# Patient Record
Sex: FEMALE | Race: WHITE | Employment: OTHER | URBAN - METROPOLITAN AREA
[De-identification: names, ages, dates, MRNs, and addresses within clinical notes are randomized per-mention and may not be internally consistent; named-entity substitution may affect disease eponyms.]

---

## 2017-01-05 DIAGNOSIS — Z12.31 SCREENING MAMMOGRAM, ENCOUNTER FOR: ICD-10-CM

## 2017-01-09 DIAGNOSIS — Z78.0 POST-MENOPAUSAL: ICD-10-CM

## 2017-01-12 DIAGNOSIS — M85.80 OSTEOPENIA: ICD-10-CM

## 2017-01-13 ENCOUNTER — TELEPHONE (OUTPATIENT)
Dept: FAMILY MEDICINE CLINIC | Age: 82
End: 2017-01-13

## 2017-01-13 LAB — VITAMIN D 25-HYDROXY: 26.84 NG/ML (ref 30–100)

## 2017-01-15 DIAGNOSIS — E55.9 VITAMIN D DEFICIENCY: Primary | ICD-10-CM

## 2017-01-16 ENCOUNTER — TELEPHONE (OUTPATIENT)
Dept: FAMILY MEDICINE CLINIC | Age: 82
End: 2017-01-16

## 2017-01-17 ENCOUNTER — OFFICE VISIT (OUTPATIENT)
Dept: FAMILY MEDICINE CLINIC | Age: 82
End: 2017-01-17

## 2017-01-17 VITALS
HEIGHT: 61 IN | RESPIRATION RATE: 16 BRPM | WEIGHT: 143.13 LBS | BODY MASS INDEX: 27.02 KG/M2 | SYSTOLIC BLOOD PRESSURE: 150 MMHG | HEART RATE: 80 BPM | DIASTOLIC BLOOD PRESSURE: 84 MMHG

## 2017-01-17 DIAGNOSIS — M85.80 OSTEOPENIA: Primary | ICD-10-CM

## 2017-01-17 PROCEDURE — 99213 OFFICE O/P EST LOW 20 MIN: CPT | Performed by: FAMILY MEDICINE

## 2017-01-17 RX ORDER — RALOXIFENE HYDROCHLORIDE 60 MG/1
60 TABLET, FILM COATED ORAL DAILY
Qty: 90 TABLET | Refills: 3 | Status: SHIPPED | OUTPATIENT
Start: 2017-01-17 | End: 2018-01-16

## 2017-01-22 ASSESSMENT — ENCOUNTER SYMPTOMS
SINUS PRESSURE: 0
CONSTIPATION: 0
SHORTNESS OF BREATH: 0

## 2017-05-12 LAB
A/G RATIO: 2.4 (ref 1.5–2.5)
ALBUMIN SERPL-MCNC: 4.6 GM/DL (ref 3.5–5)
ALP BLD-CCNC: 39 IU/L (ref 39–118)
ALT SERPL-CCNC: 21 IU/L (ref 10–40)
ANION GAP SERPL CALCULATED.3IONS-SCNC: 10 MMOL/L (ref 4–12)
AST SERPL-CCNC: 21 IU/L (ref 15–41)
BILIRUB SERPL-MCNC: 1.1 MG/DL (ref 0.2–1)
BUN BLDV-MCNC: 28 MG/DL (ref 7–20)
CALCIUM SERPL-MCNC: 9.6 MG/DL (ref 8.8–10.5)
CHLORIDE BLD-SCNC: 106 MEQ/L (ref 101–111)
CHOLESTEROL/HDL RELATIVE RISK: 4.7 (ref 4–4.4)
CHOLESTEROL: 250 MG/DL
CO2: 26 MEQ/L (ref 21–32)
CREAT SERPL-MCNC: 1.27 MG/DL (ref 0.6–1.3)
CREATININE CLEARANCE: 40
DIRECT-LDL / HDL RISK: 3.5
GLUCOSE: 103 MG/DL (ref 70–110)
HDLC SERPL-MCNC: 53 MG/DL
LDL CHOLESTEROL DIRECT: 189 MG/DL
POTASSIUM SERPL-SCNC: 4.2 MEQ/L (ref 3.6–5)
SODIUM BLD-SCNC: 142 MEQ/L (ref 135–145)
TOTAL PROTEIN: 6.5 G/DL (ref 6.2–8)
TRIGL SERPL-MCNC: 267 MG/DL
TSH SERPL DL<=0.05 MIU/L-ACNC: 11.66 MCIU/ML (ref 0.49–4.67)
VLDLC SERPL CALC-MCNC: 53 MG/DL

## 2017-07-18 ENCOUNTER — OFFICE VISIT (OUTPATIENT)
Dept: FAMILY MEDICINE CLINIC | Age: 82
End: 2017-07-18

## 2017-07-18 VITALS
BODY MASS INDEX: 27.75 KG/M2 | DIASTOLIC BLOOD PRESSURE: 60 MMHG | WEIGHT: 147 LBS | RESPIRATION RATE: 14 BRPM | HEART RATE: 80 BPM | SYSTOLIC BLOOD PRESSURE: 138 MMHG | HEIGHT: 61 IN

## 2017-07-18 DIAGNOSIS — I10 ESSENTIAL HYPERTENSION: Primary | ICD-10-CM

## 2017-07-18 DIAGNOSIS — E55.9 VITAMIN D DEFICIENCY: ICD-10-CM

## 2017-07-18 DIAGNOSIS — E03.9 ACQUIRED HYPOTHYROIDISM: ICD-10-CM

## 2017-07-18 PROCEDURE — 99214 OFFICE O/P EST MOD 30 MIN: CPT | Performed by: FAMILY MEDICINE

## 2017-07-18 ASSESSMENT — ENCOUNTER SYMPTOMS
CONSTIPATION: 0
SINUS PRESSURE: 0
SHORTNESS OF BREATH: 0

## 2017-07-18 ASSESSMENT — PATIENT HEALTH QUESTIONNAIRE - PHQ9
SUM OF ALL RESPONSES TO PHQ9 QUESTIONS 1 & 2: 0
2. FEELING DOWN, DEPRESSED OR HOPELESS: 0
SUM OF ALL RESPONSES TO PHQ QUESTIONS 1-9: 0
1. LITTLE INTEREST OR PLEASURE IN DOING THINGS: 0

## 2017-08-01 LAB
TSH SERPL DL<=0.05 MIU/L-ACNC: 9.61 MCIU/ML (ref 0.49–4.67)
VITAMIN D 25-HYDROXY: 73.17 NG/ML (ref 30–100)

## 2017-08-03 DIAGNOSIS — E03.9 ACQUIRED HYPOTHYROIDISM: Primary | ICD-10-CM

## 2017-08-03 DIAGNOSIS — E55.9 VITAMIN D DEFICIENCY: ICD-10-CM

## 2017-08-04 ENCOUNTER — TELEPHONE (OUTPATIENT)
Dept: FAMILY MEDICINE CLINIC | Age: 82
End: 2017-08-04

## 2017-08-29 LAB
AVERAGE GLUCOSE: NORMAL
HBA1C MFR BLD: 6.8 %

## 2017-09-26 ENCOUNTER — TELEPHONE (OUTPATIENT)
Dept: FAMILY MEDICINE CLINIC | Age: 82
End: 2017-09-26

## 2017-09-28 ENCOUNTER — OFFICE VISIT (OUTPATIENT)
Dept: FAMILY MEDICINE CLINIC | Age: 82
End: 2017-09-28

## 2017-09-28 VITALS
DIASTOLIC BLOOD PRESSURE: 70 MMHG | RESPIRATION RATE: 16 BRPM | HEART RATE: 72 BPM | HEIGHT: 61 IN | SYSTOLIC BLOOD PRESSURE: 148 MMHG | BODY MASS INDEX: 26.65 KG/M2 | WEIGHT: 141.13 LBS

## 2017-09-28 DIAGNOSIS — T14.8XXA MUSCLE STRAIN: ICD-10-CM

## 2017-09-28 DIAGNOSIS — R73.9 HYPERGLYCEMIA: Primary | ICD-10-CM

## 2017-09-28 LAB
GLUCOSE BLD-MCNC: 94 MG/DL
HBA1C MFR BLD: 6.4 %

## 2017-09-28 PROCEDURE — 99213 OFFICE O/P EST LOW 20 MIN: CPT | Performed by: FAMILY MEDICINE

## 2017-09-28 PROCEDURE — 82962 GLUCOSE BLOOD TEST: CPT | Performed by: FAMILY MEDICINE

## 2017-09-28 PROCEDURE — 83036 HEMOGLOBIN GLYCOSYLATED A1C: CPT | Performed by: FAMILY MEDICINE

## 2017-09-28 ASSESSMENT — ENCOUNTER SYMPTOMS
CONSTIPATION: 0
SHORTNESS OF BREATH: 0
SINUS PRESSURE: 0

## 2017-11-28 LAB
TSH SERPL DL<=0.05 MIU/L-ACNC: 12.1 MCIU/ML (ref 0.49–4.67)
VITAMIN D 25-HYDROXY: 94.09 NG/ML (ref 30–100)

## 2017-11-30 DIAGNOSIS — E55.9 VITAMIN D DEFICIENCY: Primary | ICD-10-CM

## 2017-12-01 ENCOUNTER — TELEPHONE (OUTPATIENT)
Dept: FAMILY MEDICINE CLINIC | Age: 82
End: 2017-12-01

## 2017-12-01 ENCOUNTER — OFFICE VISIT (OUTPATIENT)
Dept: FAMILY MEDICINE CLINIC | Age: 82
End: 2017-12-01

## 2017-12-01 VITALS
BODY MASS INDEX: 26.34 KG/M2 | DIASTOLIC BLOOD PRESSURE: 70 MMHG | HEIGHT: 61 IN | HEART RATE: 60 BPM | SYSTOLIC BLOOD PRESSURE: 140 MMHG | RESPIRATION RATE: 16 BRPM | WEIGHT: 139.5 LBS

## 2017-12-01 DIAGNOSIS — M13.0 POLYARTHRITIS: ICD-10-CM

## 2017-12-01 DIAGNOSIS — E03.4 HYPOTHYROIDISM DUE TO ACQUIRED ATROPHY OF THYROID: ICD-10-CM

## 2017-12-01 DIAGNOSIS — R10.10 UPPER ABDOMINAL PAIN: Primary | ICD-10-CM

## 2017-12-01 DIAGNOSIS — I10 ESSENTIAL HYPERTENSION: ICD-10-CM

## 2017-12-01 PROCEDURE — 99213 OFFICE O/P EST LOW 20 MIN: CPT | Performed by: FAMILY MEDICINE

## 2017-12-01 RX ORDER — LOSARTAN POTASSIUM AND HYDROCHLOROTHIAZIDE 12.5; 1 MG/1; MG/1
1 TABLET ORAL DAILY
Qty: 90 TABLET | Refills: 3 | Status: SHIPPED | OUTPATIENT
Start: 2017-12-01 | End: 2018-01-21 | Stop reason: SINTOL

## 2017-12-01 RX ORDER — LEVOTHYROXINE SODIUM 88 MCG
88 TABLET ORAL DAILY
Qty: 90 TABLET | Refills: 3 | Status: SHIPPED | OUTPATIENT
Start: 2017-12-01 | End: 2019-08-20

## 2017-12-01 RX ORDER — MELOXICAM 15 MG/1
15 TABLET ORAL DAILY
Qty: 90 TABLET | Refills: 3 | Status: SHIPPED | OUTPATIENT
Start: 2017-12-01 | End: 2018-01-16

## 2017-12-01 ASSESSMENT — ENCOUNTER SYMPTOMS
DIARRHEA: 0
ABDOMINAL PAIN: 1
SINUS PRESSURE: 0
SHORTNESS OF BREATH: 0
NAUSEA: 0
CONSTIPATION: 0

## 2017-12-01 ASSESSMENT — PATIENT HEALTH QUESTIONNAIRE - PHQ9
1. LITTLE INTEREST OR PLEASURE IN DOING THINGS: 0
SUM OF ALL RESPONSES TO PHQ QUESTIONS 1-9: 0
SUM OF ALL RESPONSES TO PHQ9 QUESTIONS 1 & 2: 0
2. FEELING DOWN, DEPRESSED OR HOPELESS: 0

## 2017-12-01 NOTE — TELEPHONE ENCOUNTER
----- Message from Denia Valenzuela MD sent at 11/30/2017 10:58 PM EST -----  Let her know that the vit D level is fine and she can now stop it all together. Check the vit D level though in late May. The TSH is even more underactive than when it was checked last. See if she is taking it properly, and is on the MELITA synthroid 88 mcg every day.

## 2017-12-01 NOTE — PROGRESS NOTES
Subjective:      Patient ID: Roselyn Barr is a 80 y.o. female. HPI  1. There is soreness to the bilateral subcostal area at night and seems to be getting worse. There is some radiation down the left side. 2. There are leg cramps and pain in the posterior thighs/lower legs and feet since the summer and seems to be worsening. 3. She has been on the synthroid every other day for months. She was concerned that it had caused the hair to fall out in the past. We reviewed the TSH still being up  Review of Systems   Constitutional: Negative for fatigue. HENT: Negative for sinus pressure. Eyes: Negative for visual disturbance. Respiratory: Negative for shortness of breath. Cardiovascular: Negative for chest pain. Gastrointestinal: Positive for abdominal pain. Negative for constipation, diarrhea and nausea. Genitourinary: Negative. Musculoskeletal: Positive for joint swelling and myalgias. Negative for arthralgias. Skin: Negative for rash. Neurological: Negative for headaches. The patient's medications, allergies, past medical problems, surgical, social, and family histories were reviewed and updated as needed. Objective:   Physical Exam   Constitutional: She is oriented to person, place, and time. She appears well-developed and well-nourished. No distress. HENT:   Head: Normocephalic and atraumatic. Eyes: Conjunctivae are normal. No scleral icterus. Neck: No tracheal deviation present. Cardiovascular: Normal rate, regular rhythm and normal heart sounds. Pulmonary/Chest: Effort normal and breath sounds normal.   Abdominal: Soft. Bowel sounds are normal. She exhibits no mass. There is no tenderness. Musculoskeletal: She exhibits no edema. Neurological: She is alert and oriented to person, place, and time. Skin: Skin is warm and dry. Psychiatric: She has a normal mood and affect. Her behavior is normal.   Blood pressure (!) 140/70, pulse 60, resp.  rate 16, height 5' 1\"

## 2017-12-03 LAB
CREAT SERPL-MCNC: 2.4 MG/DL (ref 0.6–1.3)
CREATININE CLEARANCE: 19

## 2017-12-04 ENCOUNTER — TELEPHONE (OUTPATIENT)
Dept: FAMILY MEDICINE CLINIC | Age: 82
End: 2017-12-04

## 2017-12-04 NOTE — TELEPHONE ENCOUNTER
Samantha Ponce from [address] [place] [address] called to inform you pt's creatinine was to high to do contrast for her CT per Dr Yvonne Durham.    Test was performed without contrast

## 2017-12-06 ENCOUNTER — TELEPHONE (OUTPATIENT)
Dept: FAMILY MEDICINE CLINIC | Age: 82
End: 2017-12-06

## 2017-12-06 DIAGNOSIS — R10.10 UPPER ABDOMINAL PAIN: Primary | ICD-10-CM

## 2017-12-13 ENCOUNTER — TELEPHONE (OUTPATIENT)
Dept: FAMILY MEDICINE CLINIC | Age: 82
End: 2017-12-13

## 2017-12-13 NOTE — TELEPHONE ENCOUNTER
----- Message from Chemo Miranda MD sent at 12/13/2017  1:29 PM EST -----  Let her know that the [facility] showed several gall stones but no proof that they are the cause of her trouble. She could discuss it with a general surgeon or GI if she wishes. Does she have a preference on some one she would want to see?

## 2018-01-09 LAB — TSH SERPL DL<=0.05 MIU/L-ACNC: 1.31 MCIU/ML (ref 0.49–4.67)

## 2018-01-12 ENCOUNTER — TELEPHONE (OUTPATIENT)
Dept: FAMILY MEDICINE CLINIC | Age: 83
End: 2018-01-12

## 2018-01-16 ENCOUNTER — OFFICE VISIT (OUTPATIENT)
Dept: FAMILY MEDICINE CLINIC | Age: 83
End: 2018-01-16

## 2018-01-16 VITALS
SYSTOLIC BLOOD PRESSURE: 134 MMHG | HEART RATE: 88 BPM | BODY MASS INDEX: 25.49 KG/M2 | DIASTOLIC BLOOD PRESSURE: 54 MMHG | HEIGHT: 61 IN | WEIGHT: 135 LBS | RESPIRATION RATE: 14 BRPM

## 2018-01-16 DIAGNOSIS — I10 ESSENTIAL HYPERTENSION: Primary | ICD-10-CM

## 2018-01-16 DIAGNOSIS — K80.20 CALCULUS OF GALLBLADDER WITHOUT CHOLECYSTITIS WITHOUT OBSTRUCTION: ICD-10-CM

## 2018-01-16 DIAGNOSIS — K44.9 HIATAL HERNIA: ICD-10-CM

## 2018-01-16 LAB
ANION GAP SERPL CALCULATED.3IONS-SCNC: 8 MMOL/L (ref 4–12)
BUN BLDV-MCNC: 63 MG/DL (ref 7–20)
CALCIUM SERPL-MCNC: 10.4 MG/DL (ref 8.8–10.5)
CHLORIDE BLD-SCNC: 105 MEQ/L (ref 101–111)
CO2: 27 MEQ/L (ref 21–32)
CREAT SERPL-MCNC: 5.49 MG/DL (ref 0.6–1.3)
CREATININE CLEARANCE: 7
GLUCOSE: 86 MG/DL (ref 70–110)
POTASSIUM SERPL-SCNC: 4.6 MEQ/L (ref 3.6–5)
SODIUM BLD-SCNC: 140 MEQ/L (ref 135–145)

## 2018-01-16 PROCEDURE — 99213 OFFICE O/P EST LOW 20 MIN: CPT | Performed by: FAMILY MEDICINE

## 2018-01-16 ASSESSMENT — ENCOUNTER SYMPTOMS
SINUS PRESSURE: 0
SHORTNESS OF BREATH: 0
CONSTIPATION: 0

## 2018-01-16 NOTE — PROGRESS NOTES
Subjective:      Patient ID: Carla Smith is a 80 y.o. female. HPI  1. We discussed the upper abd pain and how she had a CT and US. The pain is resolved. 2. We reviewed the hiatial hernia and gall stones  3. We discussed the elevated Cr from when she had the CT done and the need to repeat it  4. She stopped the evista due to muscle and joint pains and noticed an improvement but not complete  Review of Systems   Constitutional: Negative for fatigue. HENT: Negative for sinus pressure. Eyes: Negative for visual disturbance. Respiratory: Negative for shortness of breath. Cardiovascular: Negative for chest pain. Gastrointestinal: Negative for constipation. Genitourinary: Negative. Musculoskeletal: Negative for arthralgias. Skin: Negative for rash. Neurological: Negative for headaches. The patient's medications, allergies, past medical problems, surgical, social, and family histories were reviewed and updated as needed. Objective:   Physical Exam   Constitutional: She is oriented to person, place, and time. She appears well-developed and well-nourished. No distress. HENT:   Head: Normocephalic and atraumatic. Eyes: Conjunctivae are normal. No scleral icterus. Neck: No tracheal deviation present. Cardiovascular: Normal rate, regular rhythm and normal heart sounds. Pulmonary/Chest: Effort normal and breath sounds normal.   Musculoskeletal: She exhibits no edema. Neurological: She is alert and oriented to person, place, and time. Skin: Skin is warm and dry. Psychiatric: She has a normal mood and affect. Her behavior is normal.   Blood pressure (!) 134/54, pulse 88, resp. rate 14, height 5' 1\" (1.549 m), weight 135 lb (61.2 kg), not currently breastfeeding. Assessment:      1. Essential hypertension  Basic Metabolic Panel   2. Hiatal hernia     3.  Calculus of gallbladder without cholecystitis without obstruction             Plan:      Do the follow up non fasting

## 2018-01-21 DIAGNOSIS — I10 ESSENTIAL HYPERTENSION: Primary | ICD-10-CM

## 2018-01-22 ENCOUNTER — TELEPHONE (OUTPATIENT)
Dept: FAMILY MEDICINE CLINIC | Age: 83
End: 2018-01-22

## 2018-01-24 LAB
ANION GAP SERPL CALCULATED.3IONS-SCNC: 10 MMOL/L (ref 4–12)
BUN BLDV-MCNC: 57 MG/DL (ref 7–20)
CALCIUM SERPL-MCNC: 10.4 MG/DL (ref 8.8–10.5)
CHLORIDE BLD-SCNC: 104 MEQ/L (ref 101–111)
CO2: 24 MEQ/L (ref 21–32)
CREAT SERPL-MCNC: 4.7 MG/DL (ref 0.6–1.3)
CREATININE CLEARANCE: 9
GLUCOSE: 93 MG/DL (ref 70–110)
POTASSIUM SERPL-SCNC: 4 MEQ/L (ref 3.6–5)
SODIUM BLD-SCNC: 138 MEQ/L (ref 135–145)

## 2018-01-25 DIAGNOSIS — N17.9 ACUTE KIDNEY INJURY (HCC): Primary | ICD-10-CM

## 2018-01-26 ENCOUNTER — TELEPHONE (OUTPATIENT)
Dept: FAMILY MEDICINE CLINIC | Age: 83
End: 2018-01-26

## 2018-01-26 NOTE — TELEPHONE ENCOUNTER
----- Message from Valentine Tomas MD sent at 1/25/2018  9:13 PM EST -----  Let her know that the kidney functions is only slighty better. She needs to see a kidney specialist. Does she want to use [facility] or [facility]. She will also need a renal US. I would like to see her 1/26/18 and to get started on ordering the tests even prior to the appointment with me. Continue off the losartan/hydrochlorothiazide and meloxicam and any other NSAID like aleve or ibuprofen.

## 2018-01-26 NOTE — TELEPHONE ENCOUNTER
Called pt to advise of need for test and sched at [facility] 1/26/18 at 3 pm pt needs to drink 20 oz fluid 1 hr before and sched for appt 1/29/18 pt is leaving for [place] in 1 1/2 wks from now will talk to you about referral at appt

## 2018-01-29 ENCOUNTER — OFFICE VISIT (OUTPATIENT)
Dept: FAMILY MEDICINE CLINIC | Age: 83
End: 2018-01-29

## 2018-01-29 ENCOUNTER — TELEPHONE (OUTPATIENT)
Dept: FAMILY MEDICINE CLINIC | Age: 83
End: 2018-01-29

## 2018-01-29 VITALS
SYSTOLIC BLOOD PRESSURE: 166 MMHG | HEART RATE: 84 BPM | BODY MASS INDEX: 25.18 KG/M2 | HEIGHT: 61 IN | WEIGHT: 133.38 LBS | DIASTOLIC BLOOD PRESSURE: 76 MMHG | RESPIRATION RATE: 16 BRPM

## 2018-01-29 DIAGNOSIS — N13.30 HYDRONEPHROSIS OF RIGHT KIDNEY: Primary | ICD-10-CM

## 2018-01-29 DIAGNOSIS — R31.21 ASYMPTOMATIC MICROSCOPIC HEMATURIA: ICD-10-CM

## 2018-01-29 DIAGNOSIS — I10 ESSENTIAL HYPERTENSION: ICD-10-CM

## 2018-01-29 DIAGNOSIS — N17.9 ACUTE KIDNEY INJURY (NONTRAUMATIC) (HCC): ICD-10-CM

## 2018-01-29 LAB
BACTERIA URINE, POC: ABNORMAL
BILIRUBIN URINE: 0 MG/DL
BLOOD, URINE: POSITIVE
CASTS URINE, POC: ABNORMAL
CLARITY: ABNORMAL
COLOR: COLORLESS
CRYSTALS URINE, POC: ABNORMAL
EPI CELLS URINE, POC: ABNORMAL
GLUCOSE URINE: ABNORMAL
KETONES, URINE: POSITIVE
LEUKOCYTE EST, POC: ABNORMAL
NITRITE, URINE: POSITIVE
PH UA: 5 (ref 4.5–8)
PROTEIN UA: POSITIVE
RBC URINE, POC: ABNORMAL
SPECIFIC GRAVITY UA: 1.01 (ref 1–1.03)
UROBILINOGEN, URINE: NORMAL
WBC URINE, POC: ABNORMAL
YEAST URINE, POC: ABNORMAL

## 2018-01-29 PROCEDURE — 99214 OFFICE O/P EST MOD 30 MIN: CPT | Performed by: FAMILY MEDICINE

## 2018-01-29 PROCEDURE — 81000 URINALYSIS NONAUTO W/SCOPE: CPT | Performed by: FAMILY MEDICINE

## 2018-01-29 RX ORDER — AMLODIPINE BESYLATE 5 MG/1
5 TABLET ORAL DAILY
Qty: 30 TABLET | Refills: 11 | Status: SHIPPED | OUTPATIENT
Start: 2018-01-29 | End: 2018-03-16 | Stop reason: ALTCHOICE

## 2018-01-29 ASSESSMENT — ENCOUNTER SYMPTOMS
CONSTIPATION: 0
SINUS PRESSURE: 0
SHORTNESS OF BREATH: 0

## 2018-01-29 NOTE — TELEPHONE ENCOUNTER
----- Message from Carmen Ibanez MD sent at 1/29/2018 11:04 AM EST -----  I spoke with Dr Cassi Sharp and he wishes her to have a CT abd/pelvis with oral contrast only before the appointment 1/31/18

## 2018-01-29 NOTE — PROGRESS NOTES
Specific Gravity, UA 1.010 1.005 - 1.030    Blood, Urine Positive     pH, UA 5 4.5 - 8.0    Protein, UA Positive (A) Negative    Nitrite, Urine Positive     Leukocytes, UA large     Urobilinogen, Urine Normal     rbc urine, poc none     wbc urine, poc loaded     bacteria urine, poc loaded     yeast urine, poc      casts urine, poc      epi cells urine, poc none     crystals urine, poc           Assessment:      1. Hydronephrosis of right kidney  External Referral To Urology    POC URINE with Microscopic   2. Essential hypertension  amLODIPine (NORVASC) 5 MG tablet    POC URINE with Microscopic   3. Acute kidney injury (nontraumatic) [facility])  External Referral To Urology    External Referral To Nephrology    POC URINE with Microscopic           Plan:       We will try to reach the nephrologist and the urologist  We will call about the urine culture result  Start the amlodipine   See me next week if you are still in town

## 2018-01-29 NOTE — PATIENT INSTRUCTIONS
We will try to reach the nephrologist and the urologist  We will call about the urine culture result  Start the amlodipine   See me next week if you are still in town

## 2018-01-31 LAB — URINE CULTURE, ROUTINE: ABNORMAL

## 2018-02-01 RX ORDER — SULFAMETHOXAZOLE AND TRIMETHOPRIM 800; 160 MG/1; MG/1
1 TABLET ORAL 2 TIMES DAILY
Qty: 10 TABLET | Refills: 0 | Status: SHIPPED | OUTPATIENT
Start: 2018-02-01 | End: 2018-02-11

## 2018-02-02 ENCOUNTER — TELEPHONE (OUTPATIENT)
Dept: FAMILY MEDICINE CLINIC | Age: 83
End: 2018-02-02

## 2018-02-12 ENCOUNTER — TELEPHONE (OUTPATIENT)
Dept: FAMILY MEDICINE CLINIC | Age: 83
End: 2018-02-12

## 2018-02-12 NOTE — TELEPHONE ENCOUNTER
Spoke with Brook. Introduced self/ role. Kinga Paige was recently discharged from [facility] for acute kidney injury. Kinga Paige voiced she is feeling better. She is urinating well without concerns. Reviewed medications with Brook, she voiced she got all scripts. Kinga Paige voiced she has a follow up appointment set for this Friday February 16, 2018. Educated on the importance of monitoring blood pressure. No concerns or needs voiced at this time, encouraged to contact the office if needed.

## 2018-03-09 ENCOUNTER — TELEPHONE (OUTPATIENT)
Dept: FAMILY MEDICINE CLINIC | Age: 83
End: 2018-03-09

## 2018-03-09 RX ORDER — EZETIMIBE 10 MG/1
10 TABLET ORAL DAILY
COMMUNITY
End: 2019-08-20 | Stop reason: ALTCHOICE

## 2018-03-09 RX ORDER — FUROSEMIDE 20 MG/1
20 TABLET ORAL DAILY
COMMUNITY

## 2018-03-09 RX ORDER — ASPIRIN 81 MG/1
81 TABLET, CHEWABLE ORAL DAILY
COMMUNITY

## 2018-03-09 RX ORDER — WARFARIN SODIUM 2.5 MG/1
2.5 TABLET ORAL DAILY
COMMUNITY

## 2018-03-09 RX ORDER — PROPAFENONE HYDROCHLORIDE 150 MG/1
150 TABLET, FILM COATED ORAL 2 TIMES DAILY
COMMUNITY
End: 2019-08-20 | Stop reason: ALTCHOICE

## 2018-03-11 PROBLEM — C90.00 MULTIPLE MYELOMA (HCC): Status: ACTIVE | Noted: 2018-02-01

## 2018-03-16 ENCOUNTER — OFFICE VISIT (OUTPATIENT)
Dept: FAMILY MEDICINE CLINIC | Age: 83
End: 2018-03-16

## 2018-03-16 VITALS
DIASTOLIC BLOOD PRESSURE: 60 MMHG | RESPIRATION RATE: 16 BRPM | HEIGHT: 61 IN | HEART RATE: 64 BPM | BODY MASS INDEX: 24.73 KG/M2 | WEIGHT: 131 LBS | SYSTOLIC BLOOD PRESSURE: 140 MMHG

## 2018-03-16 DIAGNOSIS — C90.00 MULTIPLE MYELOMA NOT HAVING ACHIEVED REMISSION (HCC): Primary | ICD-10-CM

## 2018-03-16 PROCEDURE — 99214 OFFICE O/P EST MOD 30 MIN: CPT | Performed by: FAMILY MEDICINE

## 2018-03-16 RX ORDER — PANTOPRAZOLE SODIUM 40 MG/1
TABLET, DELAYED RELEASE ORAL
COMMUNITY
Start: 2018-03-01

## 2018-03-16 RX ORDER — ONDANSETRON HYDROCHLORIDE 8 MG/1
8 TABLET, FILM COATED ORAL EVERY 8 HOURS PRN
COMMUNITY
Start: 2018-03-05 | End: 2020-11-25 | Stop reason: ALTCHOICE

## 2018-03-16 RX ORDER — DEXAMETHASONE 4 MG/1
20 TABLET ORAL
COMMUNITY
Start: 2018-03-01

## 2018-03-16 RX ORDER — LENALIDOMIDE 10 MG/1
CAPSULE ORAL
COMMUNITY
Start: 2018-03-07 | End: 2020-11-25 | Stop reason: ALTCHOICE

## 2018-04-03 ENCOUNTER — TELEPHONE (OUTPATIENT)
Dept: FAMILY MEDICINE CLINIC | Age: 83
End: 2018-04-03

## 2019-03-20 ENCOUNTER — TELEPHONE (OUTPATIENT)
Dept: FAMILY MEDICINE CLINIC | Age: 84
End: 2019-03-20

## 2019-03-20 RX ORDER — TRAMADOL HYDROCHLORIDE 50 MG/1
50 TABLET ORAL EVERY 6 HOURS PRN
COMMUNITY
End: 2019-08-20 | Stop reason: ALTCHOICE

## 2019-06-04 ENCOUNTER — OFFICE VISIT (OUTPATIENT)
Dept: INFECTIOUS DISEASES | Age: 84
End: 2019-06-04

## 2019-06-04 VITALS
HEIGHT: 61 IN | DIASTOLIC BLOOD PRESSURE: 58 MMHG | TEMPERATURE: 98.7 F | HEART RATE: 65 BPM | SYSTOLIC BLOOD PRESSURE: 115 MMHG | BODY MASS INDEX: 24.58 KG/M2 | WEIGHT: 130.2 LBS

## 2019-06-04 DIAGNOSIS — T85.71XA INFECTION ASSOCIATED WITH PERITONEAL DIALYSIS CATHETER, INITIAL ENCOUNTER (HCC): Primary | ICD-10-CM

## 2019-06-04 PROCEDURE — 99203 OFFICE O/P NEW LOW 30 MIN: CPT | Performed by: INTERNAL MEDICINE

## 2019-06-04 RX ORDER — POTASSIUM CHLORIDE 1500 MG/1
20 TABLET, EXTENDED RELEASE ORAL DAILY
COMMUNITY
Start: 2019-03-02 | End: 2019-08-20 | Stop reason: ALTCHOICE

## 2019-06-04 RX ORDER — ATORVASTATIN CALCIUM 40 MG/1
40 TABLET, FILM COATED ORAL NIGHTLY
COMMUNITY
Start: 2019-05-09 | End: 2019-06-04

## 2019-06-04 RX ORDER — FLUCONAZOLE 100 MG/1
100 TABLET ORAL DAILY
COMMUNITY
Start: 2019-06-03 | End: 2019-08-20 | Stop reason: ALTCHOICE

## 2019-06-04 RX ORDER — MELOXICAM 15 MG/1
15 TABLET ORAL DAILY
COMMUNITY
Start: 2019-03-28 | End: 2020-09-28

## 2019-06-04 RX ORDER — OXYBUTYNIN CHLORIDE 5 MG/1
5 TABLET ORAL PRN
COMMUNITY
End: 2019-08-20 | Stop reason: ALTCHOICE

## 2019-06-04 RX ORDER — ACETAMINOPHEN 160 MG
50000 TABLET,DISINTEGRATING ORAL WEEKLY
COMMUNITY

## 2019-06-18 ENCOUNTER — OFFICE VISIT (OUTPATIENT)
Dept: INFECTIOUS DISEASES | Age: 84
End: 2019-06-18

## 2019-06-18 VITALS
TEMPERATURE: 99 F | HEART RATE: 92 BPM | DIASTOLIC BLOOD PRESSURE: 79 MMHG | SYSTOLIC BLOOD PRESSURE: 121 MMHG | WEIGHT: 128.4 LBS | HEIGHT: 61 IN | BODY MASS INDEX: 24.24 KG/M2

## 2019-06-18 DIAGNOSIS — T85.71XS: Primary | ICD-10-CM

## 2019-06-18 PROCEDURE — 99213 OFFICE O/P EST LOW 20 MIN: CPT | Performed by: INTERNAL MEDICINE

## 2019-06-18 RX ORDER — CLOTRIMAZOLE 1 %
CREAM (GRAM) TOPICAL DAILY
COMMUNITY
Start: 2019-06-04 | End: 2019-08-20 | Stop reason: ALTCHOICE

## 2019-06-18 NOTE — PROGRESS NOTES
[facility]         Progress Note      Michael Glasgow  MEDICAL RECORD NUMBER:  [id number]  AGE: 80 y.o. GENDER: female  : 1935  EPISODE DATE:  2019    Subjective:     Chief Complaint   Patient presents with    Follow-up     Infection associated with peritoneal dialysis catheter         HISTORY of PRESENT ILLNESS HPI  She is an 80-year-old female patient with end-stage renal disease on peritoneal dialysis was initially sent to the ID clinic for exit site infection. The culture grew Candida albicans and was placed on oral Diflucan and antifungal cream.  She was advised to hold the gentamicin ointment to the catheter site. Today she was seen for her follow-up visit she has no new complaints, she denies any pain from the exit site there is minimal redness at that tip of the exit site there was not any induration or fever or chills. Overall she is feeling much better.   Her peritoneal dialysis fluid is clear there was no crusting fibrin products      PAST MEDICAL HISTORY        Diagnosis Date    Cholelithiasis 2018    Colon polyp, hyperplastic , ,     Fibrocystic breast disease     Hiatal hernia 2018    HTN (hypertension) 1999    Hyperlipemia 2013    Hyperlipidemia 2009    Hypothyroidism 2014    Multiple myeloma ([address] 2018    Osteopenia 2016    Polyarthritis     Varicose vein of leg     Vitamin D deficiency 2017       PAST SURGICAL HISTORY    Past Surgical History:   Procedure Laterality Date    CARPAL TUNNEL RELEASE      right    [facility]      growth off of tailbone    PACEMAKER INSERTION  2018    TONSILLECTOMY AND ADENOIDECTOMY         FAMILY HISTORY    Family History   Problem Relation Age of Onset    Heart Disease Mother        SOCIAL HISTORY    Social History     Tobacco Use    Smoking status: Never Smoker    Smokeless tobacco: Never Used   Substance Use Topics    Alcohol use: No    Drug use: No       ALLERGIES    Allergies   Allergen Reactions    Zetia [Ezetimibe]      Pt unsure of side affects       MEDICATIONS    Current Outpatient Medications on File Prior to Visit   Medication Sig Dispense Refill    clotrimazole (LOTRIMIN) 1 % cream Apply topically daily      fluconazole (DIFLUCAN) 100 MG tablet Take 100 mg by mouth daily      Calcium Polycarbophil (FIBERCON PO) Take by mouth 2 times daily      B Complex-C-Folic Acid (TRIPHROCAPS PO) Take by mouth daily      MAGNESIUM CL-CALCIUM CARBONATE PO Take 2 tablets by mouth      Cholecalciferol (VITAMIN D3) 2000 units CAPS Take by mouth daily      KLOR-CON M20 20 MEQ extended release tablet Take 20 mEq by mouth daily      oxybutynin (DITROPAN) 5 MG tablet Take 5 mg by mouth as needed      meloxicam (MOBIC) 15 MG tablet Take 15 mg by mouth daily      traMADol (ULTRAM) 50 MG tablet Take 50 mg by mouth every 6 hours as needed for Pain.  dexamethasone (DECADRON) 4 MG tablet 20 mg every 7 days Indications: Thakes on Wednesday 5 pills every Monday      REVLIMID 10 MG chemo capsule       pantoprazole (PROTONIX) 40 MG tablet       ondansetron (ZOFRAN) 8 MG tablet       aspirin 81 MG chewable tablet Take 81 mg by mouth daily      warfarin (COUMADIN) 2.5 MG tablet Take 2.5 mg by mouth daily      metoprolol tartrate (LOPRESSOR) 25 MG tablet Take 25 mg by mouth 2 times daily      propafenone (RYTHMOL) 150 MG tablet Take 150 mg by mouth 2 times daily      ezetimibe (ZETIA) 10 MG tablet Take 10 mg by mouth daily      furosemide (LASIX) 20 MG tablet Take 20 mg by mouth daily      SYNTHROID 88 MCG tablet Take 1 tablet by mouth Daily (Patient taking differently: Take 75 mcg by mouth Daily ) 90 tablet 3     No current facility-administered medications on file prior to visit. REVIEW OF SYSTEMS    Constitutional: no fever, no night sweats, no fatigue.   Head: no head ache , no head injury, no migranes. Eye: no blurring of vision, no double vision. Ears: no hearing difficulty, no tinnitus  Mouth/throat: no ulceration, dental caries , dysphagia  Lungs: no cough no chest pain  CVS: no palpitation, no chest pain, no shortness of breath  GI: no abdominal pain, no nausea , no vomiting, no constipation  MED: no dysuria, frequency and urgency, no hematuria, no kidney stones  Musculoskeletal: no joint pain, swelling , stiffness,  Endocrine: no polyuria, polydipsia, no cold or heat intolerance  Hematology: no anemia, no easy brusing or bleeding, no hx of clotting disorder  Dermatology: no skin rash, no eczema, no pruritis,  Psychiatry: no depression, no anxiety,no panic attacks, no suicide ideation      Objective:      /79 (Site: Right Lower Arm, Position: Sitting, Cuff Size: Small Adult)   Pulse 92   Temp 99 °F (37.2 °C)   Ht 5' 0.98\" (1.549 m)   Wt 128 lb 6.4 oz (58.2 kg)   BMI 24.27 kg/m²     Wt Readings from Last 3 Encounters:   06/18/19 128 lb 6.4 oz (58.2 kg)   06/04/19 130 lb 3.2 oz (59.1 kg)   03/16/18 131 lb (59.4 kg)       Immunization History   Administered Date(s) Administered    Influenza Vaccine, unspecified formulation 10/18/2016    Influenza Virus Vaccine 10/05/2012, 10/28/2013, 10/14/2014, 10/22/2015    Influenza, High Dose (Fluzone 65 yrs and older) 10/31/2017    Influenza, Triv, 3 Years and older, IM (Afluria (5 yrs and older) 10/18/2016    Pneumococcal 13-valent Conjugate ([id number]) 11/08/2016    Pneumococcal Polysaccharide ([id number]) 10/14/2014, 10/31/2017       PHYSICAL EXAM    /79 (Site: Right Lower Arm, Position: Sitting, Cuff Size: Small Adult)   Pulse 92   Temp 99 °F (37.2 °C)   Ht 5' 0.98\" (1.549 m)   Wt 128 lb 6.4 oz (58.2 kg)   BMI 24.27 kg/m²   General:  Awake, alert, not in distress. HEENT: Slightly pale conjunctiva, unicteric sclera, moist oral mucosa.   Chest: Bilateral air entry  Cardiovascular:  RRR ,S1S2, no murmur or gallop. Abdomen:  Soft, non tender to palpation. PD catheter in place there is no induration or drainage, the tip of the skin is red but not hot or tender  Extremities:   No edema. Skin:  Warm and dry. CNS: Oriented to person place and time         LABS       CBC:   Lab Results   Component Value Date    WBC 8.9 06/07/2019    HGB 8.8 06/07/2019    HCT 26.7 06/07/2019    .6 06/07/2019     06/07/2019     BMP:   Lab Results   Component Value Date     06/07/2019    K 4.6 06/07/2019     06/07/2019    CO2 21 06/07/2019    PHOS 4.4 06/07/2019    BUN 65 06/07/2019    CREATININE 4.53 06/07/2019     PT/INR:   Lab Results   Component Value Date    PROTIME 25.3 06/18/2019    INR 2.10 06/18/2019     Prealbumin: No results found for: PREALBUMIN  Albumin:  Lab Results   Component Value Date    LABALBU 3.5 06/07/2019     Sed Rate:No results found for: Dyane Fess  CRP: No results found for: CRP  Micro:   Lab Results   Component Value Date    [facility]  03/06/2018      Comment:      Blood Culture       Hemoglobin A1C:   Lab Results   Component Value Date    LABA1C 6.4 09/28/2017       Assessment:    PD catheter exit site local infection: It appears to be stable. Continue current  treatment, advised to contact if there is spreading redness fever abdominal pain or cloudy discoloration of the peritoneal fluid. Follow-up will be scheduled in 2 months     Patient Active Problem List   Diagnosis Code    Essential hypertension I10    Pure hypercholesterolemia E78.00    Acquired hypothyroidism E03.9    Polyarthritis M13.0    Osteopenia M85.80    Vitamin D deficiency E55.9    Colon polyp, hyperplastic K63.5    Hiatal hernia K44.9    Cholelithiasis K80.20    Multiple myeloma ([facility] [address] C90.00         Plan:     Patient examined and evaluated        Please see attached Discharge Instructions    Written patient dismissal instructions given to patient and signed by patient or POA.              Electronically signed by Inez Rosas MD,FACP@ TD@ at 1:32 PM

## 2019-08-20 ENCOUNTER — OFFICE VISIT (OUTPATIENT)
Dept: INFECTIOUS DISEASES | Age: 84
End: 2019-08-20

## 2019-08-20 VITALS
HEIGHT: 61 IN | HEART RATE: 62 BPM | DIASTOLIC BLOOD PRESSURE: 50 MMHG | TEMPERATURE: 98.6 F | SYSTOLIC BLOOD PRESSURE: 108 MMHG | BODY MASS INDEX: 25.26 KG/M2 | WEIGHT: 133.8 LBS

## 2019-08-20 DIAGNOSIS — T85.611D: Primary | ICD-10-CM

## 2019-08-20 PROCEDURE — 99213 OFFICE O/P EST LOW 20 MIN: CPT | Performed by: INTERNAL MEDICINE

## 2019-08-20 RX ORDER — ACYCLOVIR 200 MG/1
200 CAPSULE ORAL DAILY
COMMUNITY
Start: 2019-08-15 | End: 2020-09-28

## 2019-08-20 RX ORDER — GABAPENTIN 100 MG/1
100 CAPSULE ORAL 3 TIMES DAILY
COMMUNITY
Start: 2019-08-15 | End: 2020-09-28

## 2019-08-20 RX ORDER — LEVOTHYROXINE SODIUM 75 MCG
75 TABLET ORAL DAILY
COMMUNITY
Start: 2019-07-22

## 2019-08-20 RX ORDER — ALLOPURINOL 100 MG/1
100 TABLET ORAL DAILY
COMMUNITY
Start: 2019-07-15 | End: 2020-09-28

## 2019-08-20 NOTE — PROGRESS NOTES
Never Used   Substance Use Topics    Alcohol use: No    Drug use: No       ALLERGIES    Allergies   Allergen Reactions    Zetia [Ezetimibe]      Pt unsure of side affects       MEDICATIONS    Current Outpatient Medications on File Prior to Visit   Medication Sig Dispense Refill    SYNTHROID 75 MCG tablet Take 75 mcg by mouth daily      allopurinol (ZYLOPRIM) 100 MG tablet Take 100 mg by mouth daily      gabapentin (NEURONTIN) 100 MG capsule Take 100 mg by mouth 3 times daily.  acyclovir (ZOVIRAX) 200 MG capsule Take 200 mg by mouth daily      Calcium Polycarbophil (FIBERCON PO) Take by mouth 2 times daily      B Complex-C-Folic Acid (TRIPHROCAPS PO) Take by mouth daily      Cholecalciferol (VITAMIN D3) 2000 units CAPS Take 50,000 Units by mouth once a week       meloxicam (MOBIC) 15 MG tablet Take 15 mg by mouth daily      dexamethasone (DECADRON) 4 MG tablet 20 mg every 7 days Indications: Thakes on Wednesday 5 pills every Monday      REVLIMID 10 MG chemo capsule       pantoprazole (PROTONIX) 40 MG tablet       ondansetron (ZOFRAN) 8 MG tablet       aspirin 81 MG chewable tablet Take 81 mg by mouth daily      warfarin (COUMADIN) 2.5 MG tablet Take 2.5 mg by mouth daily      metoprolol tartrate (LOPRESSOR) 25 MG tablet Take 25 mg by mouth 2 times daily      furosemide (LASIX) 20 MG tablet Take 20 mg by mouth daily       No current facility-administered medications on file prior to visit. REVIEW OF SYSTEMS    Constitutional: no fever, no night sweats, no fatigue. Head: no head ache , no head injury, no migranes. Eye: no blurring of vision, no double vision. has left subconjunctival hamorrhage related to the coumadin  Ears: no hearing difficulty, no tinnitus  Mouth/throat: no ulceration, dental caries , dysphagia  Lungs: no cough no chest pain  CVS: no palpitation, no chest pain, no shortness of breath  GI: no abdominal pain, no nausea , no vomiting, no constipation  MED: no dysuria, frequency and urgency, no hematuria, no kidney stones  Musculoskeletal: no joint pain, swelling , stiffness,  Endocrine: no polyuria, polydipsia, no cold or heat intolerance  Hematology: no anemia, no easy brusing or bleeding, no hx of clotting disorder  Dermatology: no skin rash, no eczema, no pruritis,  Psychiatry: no depression, no anxiety,no panic attacks, no suicide ideation      Objective:      BP (!) 108/50 (Site: Left Upper Arm, Position: Sitting, Cuff Size: Large Adult)   Pulse 62   Temp 98.6 °F (37 °C)   Ht 5' 0.98\" (1.549 m)   Wt 133 lb 12.8 oz (60.7 kg)   BMI 25.29 kg/m²     Wt Readings from Last 3 Encounters:   08/20/19 133 lb 12.8 oz (60.7 kg)   06/18/19 128 lb 6.4 oz (58.2 kg)   06/04/19 130 lb 3.2 oz (59.1 kg)       Immunization History   Administered Date(s) Administered    Influenza 10/05/2012    Influenza Vaccine, unspecified formulation 10/18/2016    Influenza Virus Vaccine 10/28/2013, 10/14/2014, 10/22/2015    Influenza, High Dose (Fluzone 65 yrs and older) 10/31/2017    Influenza, Triv, 3 Years and older, IM (Afluria (5 yrs and older) 10/18/2016    Pneumococcal Conjugate 13-valent ([id number]) 11/08/2016    Pneumococcal Polysaccharide ([id number]) 10/14/2014, 10/31/2017       PHYSICAL EXAM    BP (!) 108/50 (Site: Left Upper Arm, Position: Sitting, Cuff Size: Large Adult)   Pulse 62   Temp 98.6 °F (37 °C)   Ht 5' 0.98\" (1.549 m)   Wt 133 lb 12.8 oz (60.7 kg)   BMI 25.29 kg/m²   General:  Awake, alert, not in distress. HEENT: pink conjunctiva, unicteric sclera, moist oral mucosa. left subconjunctival haermorrhage  Chest:  Not in distress  Cardiovascular:  RRR ,S1S2, no murmur or gallop. Abdomen:  Soft, PD catheter in place, there is redness around the exit site of the catheter, no abdominal pain  Extremities: no edema  Skin:  Warm and dry.   CNS:oriented         LABS       CBC:   Lab Results   Component Value Date    WBC 4.0 08/06/2019    HGB 11.4 08/20/2019    HCT 35.0

## 2020-09-28 ENCOUNTER — OFFICE VISIT (OUTPATIENT)
Dept: FAMILY MEDICINE CLINIC | Age: 85
End: 2020-09-28

## 2020-09-28 VITALS
HEART RATE: 60 BPM | DIASTOLIC BLOOD PRESSURE: 60 MMHG | RESPIRATION RATE: 14 BRPM | TEMPERATURE: 96.7 F | HEIGHT: 61 IN | BODY MASS INDEX: 24.99 KG/M2 | SYSTOLIC BLOOD PRESSURE: 98 MMHG | WEIGHT: 132.38 LBS

## 2020-09-28 LAB
BACTERIA URINE, POC: ABNORMAL
BILIRUBIN URINE: 0 MG/DL
BLOOD, URINE: POSITIVE
CASTS URINE, POC: ABNORMAL
CLARITY: ABNORMAL
COLOR: ABNORMAL
CRYSTALS URINE, POC: ABNORMAL
EPI CELLS URINE, POC: ABNORMAL
GLUCOSE URINE: NEGATIVE
KETONES, URINE: NEGATIVE
LEUKOCYTE EST, POC: ABNORMAL
NITRITE, URINE: NEGATIVE
PH UA: 9 (ref 4.5–8)
PROTEIN UA: POSITIVE
RBC URINE, POC: ABNORMAL
SPECIFIC GRAVITY UA: 1 (ref 1–1.03)
UROBILINOGEN, URINE: NORMAL
WBC URINE, POC: ABNORMAL
YEAST URINE, POC: ABNORMAL

## 2020-09-28 PROCEDURE — 81000 URINALYSIS NONAUTO W/SCOPE: CPT | Performed by: FAMILY MEDICINE

## 2020-09-28 PROCEDURE — 4040F PNEUMOC VAC/ADMIN/RCVD: CPT | Performed by: FAMILY MEDICINE

## 2020-09-28 PROCEDURE — G8399 PT W/DXA RESULTS DOCUMENT: HCPCS | Performed by: FAMILY MEDICINE

## 2020-09-28 PROCEDURE — 1090F PRES/ABSN URINE INCON ASSESS: CPT | Performed by: FAMILY MEDICINE

## 2020-09-28 PROCEDURE — 1123F ACP DISCUSS/DSCN MKR DOCD: CPT | Performed by: FAMILY MEDICINE

## 2020-09-28 PROCEDURE — 99213 OFFICE O/P EST LOW 20 MIN: CPT | Performed by: FAMILY MEDICINE

## 2020-09-28 PROCEDURE — G8427 DOCREV CUR MEDS BY ELIG CLIN: HCPCS | Performed by: FAMILY MEDICINE

## 2020-09-28 PROCEDURE — G8417 CALC BMI ABV UP PARAM F/U: HCPCS | Performed by: FAMILY MEDICINE

## 2020-09-28 PROCEDURE — 1036F TOBACCO NON-USER: CPT | Performed by: FAMILY MEDICINE

## 2020-09-28 RX ORDER — FERROUS SULFATE 325(65) MG
325 TABLET ORAL
COMMUNITY

## 2020-09-28 RX ORDER — MAGNESIUM CHLORIDE 71.5 G/G
2 TABLET ORAL DAILY
COMMUNITY

## 2020-09-28 RX ORDER — MULTIVITAMIN WITH IRON
100 TABLET ORAL DAILY
COMMUNITY

## 2020-09-28 RX ORDER — CEFDINIR 300 MG/1
300 CAPSULE ORAL 2 TIMES DAILY
Qty: 20 CAPSULE | Refills: 0 | Status: SHIPPED | OUTPATIENT
Start: 2020-09-28 | End: 2020-10-08

## 2020-09-28 RX ORDER — POTASSIUM CHLORIDE 1.5 G/1.77G
20 POWDER, FOR SOLUTION ORAL DAILY
COMMUNITY

## 2020-09-28 RX ORDER — PRAVASTATIN SODIUM 10 MG
10 TABLET ORAL DAILY
COMMUNITY

## 2020-09-28 ASSESSMENT — ENCOUNTER SYMPTOMS
EYE PAIN: 0
CONSTIPATION: 0
COUGH: 0
SORE THROAT: 0
SHORTNESS OF BREATH: 0
CHEST TIGHTNESS: 0
WHEEZING: 0
NAUSEA: 0
BACK PAIN: 1
ABDOMINAL PAIN: 1

## 2020-09-28 ASSESSMENT — PATIENT HEALTH QUESTIONNAIRE - PHQ9
SUM OF ALL RESPONSES TO PHQ QUESTIONS 1-9: 0
1. LITTLE INTEREST OR PLEASURE IN DOING THINGS: 0
2. FEELING DOWN, DEPRESSED OR HOPELESS: 0
SUM OF ALL RESPONSES TO PHQ9 QUESTIONS 1 & 2: 0
SUM OF ALL RESPONSES TO PHQ QUESTIONS 1-9: 0

## 2020-09-28 NOTE — PROGRESS NOTES
Subjective:      Patient ID: Melissa Zepeda is a 80 y.o. female. Coumadin  Use      on  Peritoneal  Dialysis  At  Night     Back Pain   This is a new problem. Episode onset:    2  days  The problem has been gradually worsening since onset. The pain is present in the lumbar spine (  left  flank  area  noted ). The pain is at a severity of 5/10. The pain is moderate. The pain is the same all the time. Associated symptoms include abdominal pain. Pertinent negatives include no chest pain, dysuria, fever, headaches, numbness or weakness. (   Urine  frequency) She has tried analgesics for the symptoms. Abdominal Pain   Pertinent negatives include no arthralgias, constipation, dysuria, fever, frequency, headaches or nausea. Past Medical History:   Diagnosis Date    Cholelithiasis 01/2018    Colon polyp, hyperplastic 1997, 2004, 2017    Fibrocystic breast disease     Hiatal hernia 01/2018    HTN (hypertension) 1999    Hyperlipemia 11/6/2013    Hyperlipidemia 2009    Hypothyroidism 11/2014    Multiple myeloma ([facility] 02/2018    Osteopenia 12/2016    Polyarthritis     Varicose vein of leg 2013    Vitamin D deficiency 01/2017     Review of Systems   Constitutional: Negative for appetite change, fatigue and fever. HENT: Negative for congestion, ear pain, postnasal drip and sore throat. Eyes: Negative for pain and visual disturbance. Respiratory: Negative for cough, chest tightness, shortness of breath and wheezing. Cardiovascular: Negative for chest pain, palpitations and leg swelling. Gastrointestinal: Positive for abdominal pain. Negative for constipation and nausea. Genitourinary: Negative for dysuria and frequency. Musculoskeletal: Positive for back pain. Negative for arthralgias, joint swelling, neck pain and neck stiffness. Skin: Negative for rash. Neurological: Negative for dizziness, weakness, numbness and headaches. Hematological: Negative for adenopathy.  Does not bruise/bleed easily. Psychiatric/Behavioral: Negative for behavioral problems and sleep disturbance. The patient is not nervous/anxious. BP 98/60 (Site: Right Upper Arm, Position: Sitting, Cuff Size: Medium Adult)   Pulse 60   Temp 96.7 °F (35.9 °C) (Oral)   Resp 14   Ht 5' 1\" (1.549 m)   Wt 132 lb 6 oz (60 kg)   BMI 25.01 kg/m²   Objective:   Physical Exam  Vitals signs and nursing note reviewed. Constitutional:       Appearance: She is well-developed. HENT:      Head: Normocephalic and atraumatic. Right Ear: External ear normal.      Left Ear: External ear normal.      Nose: Nose normal.   Eyes:      General: No scleral icterus. Conjunctiva/sclera: Conjunctivae normal.      Pupils: Pupils are equal, round, and reactive to light. Neck:      Musculoskeletal: Normal range of motion and neck supple. Thyroid: No thyromegaly. Vascular: No JVD. Cardiovascular:      Rate and Rhythm: Normal rate and regular rhythm. Heart sounds: Normal heart sounds. Pulmonary:      Effort: Pulmonary effort is normal.      Breath sounds: Normal breath sounds. No wheezing or rales. Abdominal:      General: Bowel sounds are normal. There is no distension. Palpations: Abdomen is soft. There is no mass. Tenderness: There is abdominal tenderness. Comments:   Left  Side  Of  Abdomen and to  Left  Flank    Musculoskeletal: Normal range of motion. General: No tenderness. Lymphadenopathy:      Cervical: No cervical adenopathy. Skin:     General: Skin is warm and dry. Findings: No rash. Neurological:      Mental Status: She is alert and oriented to person, place, and time. Cranial Nerves: No cranial nerve deficit. Deep Tendon Reflexes: Reflexes are normal and symmetric. Assessment:       Diagnosis Orders   1. Acute pyelonephritis  Culture, Urine    cefdinir (OMNICEF) 300 MG capsule   2. Urinary frequency  POC URINE with Microscopic   3.  CRF (chronic renal failure), stage 5 ([facility]     4. [facility] ([facility]           Plan:      Current Outpatient Medications   Medication Sig Dispense Refill    vitamin B-6 (PYRIDOXINE) 100 MG tablet Take 100 mg by mouth daily      potassium chloride (KLOR-CON) 20 MEQ packet Take 20 mEq by mouth daily      dilTIAZem (CARDIZEM) 30 MG tablet Take 30 mg by mouth daily      ferrous sulfate (IRON 325) 325 (65 Fe) MG tablet Take 325 mg by mouth daily (with breakfast)      pravastatin (PRAVACHOL) 10 MG tablet Take 10 mg by mouth daily      magnesium cl-calcium carbonate (SLOW-MAG) 71.5-119 MG TBEC tablet Take 2 tablets by mouth daily      cefdinir (OMNICEF) 300 MG capsule Take 1 capsule by mouth 2 times daily for 10 days 20 capsule 0    SYNTHROID 75 MCG tablet Take 75 mcg by mouth daily      B Complex-C-Folic Acid (TRIPHROCAPS PO) Take by mouth daily      Cholecalciferol (VITAMIN D3) 2000 units CAPS Take 50,000 Units by mouth once a week       dexamethasone (DECADRON) 4 MG tablet 20 mg every 7 days Indications: Thakes on Wednesday 5 pills every Monday      REVLIMID 10 MG chemo capsule       pantoprazole (PROTONIX) 40 MG tablet       ondansetron (ZOFRAN) 8 MG tablet Take 8 mg by mouth every 8 hours as needed       aspirin 81 MG chewable tablet Take 81 mg by mouth daily      warfarin (COUMADIN) 2.5 MG tablet Take 2.5 mg by mouth daily      metoprolol tartrate (LOPRESSOR) 25 MG tablet Take 25 mg by mouth 2 times daily      furosemide (LASIX) 20 MG tablet Take 20 mg by mouth daily       No current facility-administered medications for this visit. Orders Placed This Encounter   Procedures    Culture, Urine     Order Specific Question:   Specify (ex-cath, midstream, cysto, etc)?      Answer:   clean culture    POC URINE with Microscopic     Results for orders placed or performed in visit on 09/28/20   POC URINE with Microscopic   Result Value Ref Range    Color, UA Light Yellow     Clarity, UA Cloudy (A) Clear    Glucose, Ur Negative     Bilirubin Urine 0 mg/dL    Ketones, Urine Negative     Specific [place], UA 1.005 1.005 - 1.030    Blood, Urine Positive (A)     pH, UA 9.0 (A) 4.5 - 8.0    Protein, UA Positive (A) Negative    Nitrite, Urine Negative     Leukocytes, UA 70+ (A)     Urobilinogen, Urine Normal     rbc urine, poc 2  to 3     wbc urine, poc 10 to  15     bacteria urine, poc 3  to 4 +     yeast urine, poc      casts urine, poc      epi cells urine, poc      crystals urine, poc           If  Fever  Or  Worse  Abdominal  Pain to  Er   Culture  Urine and  Start lico Art MD

## 2020-09-30 LAB
REPORT STATUS: NORMAL
SITE/TYPE: NORMAL
URINE CULTURE, ROUTINE: NORMAL

## 2020-10-01 ENCOUNTER — TELEPHONE (OUTPATIENT)
Dept: FAMILY MEDICINE CLINIC | Age: 85
End: 2020-10-01

## 2020-10-01 NOTE — TELEPHONE ENCOUNTER
Patient calling and saying has pain  from urinary symptoms still. And rash or lumps on body on sides. Was seen on Monday by dr. Owen Miranda atb for uti. [facility] [address]

## 2020-10-01 NOTE — TELEPHONE ENCOUNTER
The urine culture suggests that the antibiotic Dr Angely Schmitz gave her should work for the infection. I would like to see the rash/lumps mentioned in the message. Would she want to do a video visit or come in tomorrow?

## 2020-10-01 NOTE — TELEPHONE ENCOUNTER
----- Message from Juju Castellon MD sent at 10/1/2020  6:05 AM EDT -----  Call as stay on antibiotic as with ti and sensitive to omnicef . Is she better and back better ?

## 2020-10-01 NOTE — TELEPHONE ENCOUNTER
Brook informed by Phone. She stated she is feeling a little better and so is her back.  She will keep her follow up appt with Dr Marion Leal on 10-8-2020

## 2020-10-02 ENCOUNTER — OFFICE VISIT (OUTPATIENT)
Dept: FAMILY MEDICINE CLINIC | Age: 85
End: 2020-10-02

## 2020-10-02 VITALS
HEART RATE: 64 BPM | HEIGHT: 61 IN | RESPIRATION RATE: 16 BRPM | TEMPERATURE: 98.3 F | SYSTOLIC BLOOD PRESSURE: 122 MMHG | DIASTOLIC BLOOD PRESSURE: 62 MMHG | BODY MASS INDEX: 25.54 KG/M2 | WEIGHT: 135.25 LBS

## 2020-10-02 PROCEDURE — 1123F ACP DISCUSS/DSCN MKR DOCD: CPT | Performed by: FAMILY MEDICINE

## 2020-10-02 PROCEDURE — 1090F PRES/ABSN URINE INCON ASSESS: CPT | Performed by: FAMILY MEDICINE

## 2020-10-02 PROCEDURE — 1036F TOBACCO NON-USER: CPT | Performed by: FAMILY MEDICINE

## 2020-10-02 PROCEDURE — G8399 PT W/DXA RESULTS DOCUMENT: HCPCS | Performed by: FAMILY MEDICINE

## 2020-10-02 PROCEDURE — G8417 CALC BMI ABV UP PARAM F/U: HCPCS | Performed by: FAMILY MEDICINE

## 2020-10-02 PROCEDURE — 4040F PNEUMOC VAC/ADMIN/RCVD: CPT | Performed by: FAMILY MEDICINE

## 2020-10-02 PROCEDURE — G8427 DOCREV CUR MEDS BY ELIG CLIN: HCPCS | Performed by: FAMILY MEDICINE

## 2020-10-02 PROCEDURE — 99213 OFFICE O/P EST LOW 20 MIN: CPT | Performed by: FAMILY MEDICINE

## 2020-10-02 PROCEDURE — 81000 URINALYSIS NONAUTO W/SCOPE: CPT | Performed by: FAMILY MEDICINE

## 2020-10-02 RX ORDER — VALACYCLOVIR HYDROCHLORIDE 500 MG/1
500 TABLET, FILM COATED ORAL DAILY
Qty: 7 TABLET | Refills: 0 | Status: SHIPPED | OUTPATIENT
Start: 2020-10-02 | End: 2020-11-25 | Stop reason: ALTCHOICE

## 2020-10-02 RX ORDER — GABAPENTIN 100 MG/1
100 CAPSULE ORAL 2 TIMES DAILY
Qty: 60 CAPSULE | Refills: 0 | Status: SHIPPED | OUTPATIENT
Start: 2020-10-02 | End: 2020-11-01

## 2020-10-02 ASSESSMENT — ENCOUNTER SYMPTOMS
CONSTIPATION: 0
SHORTNESS OF BREATH: 0
SINUS PRESSURE: 0

## 2020-10-02 NOTE — PROGRESS NOTES
Subjective:      Patient ID: Martir Perry is a 80 y.o. female. HPI  1. The left lower flank soreness is helped with the pain  2. She denies ever having voiding symptoms  Review of Systems   Constitutional: Negative for fatigue. HENT: Negative for sinus pressure. Eyes: Negative for visual disturbance. Respiratory: Negative for shortness of breath. Cardiovascular: Negative for chest pain. Gastrointestinal: Negative for constipation. Genitourinary: Negative. Musculoskeletal: Negative for arthralgias. Skin: Positive for rash. Neurological: Negative for headaches. The patient's medications, allergies, past medical problems, surgical, social, and family histories were reviewed and updated as needed. Objective:   Physical Exam  Constitutional:       General: She is not in acute distress. Appearance: She is well-developed. HENT:      Head: Normocephalic and atraumatic. Eyes:      General: No scleral icterus. Conjunctiva/sclera: Conjunctivae normal.   Neck:      Trachea: No tracheal deviation. Cardiovascular:      Rate and Rhythm: Normal rate. Pulmonary:      Effort: Pulmonary effort is normal.   Skin:     General: Skin is warm and dry. Findings: Rash (dermatomal vesicular rash) present. Neurological:      Mental Status: She is alert and oriented to person, place, and time. Psychiatric:         Behavior: Behavior normal.     Blood pressure 122/62, pulse 64, temperature 98.3 °F (36.8 °C), temperature source Infrared, resp. rate 16, height 5' 1\" (1.549 m), weight 135 lb 4 oz (61.3 kg), not currently breastfeeding. Assessment:       Diagnosis Orders   1. Herpes zoster without complication  valACYclovir (VALTREX) 500 MG tablet    gabapentin (NEURONTIN) 100 MG capsule   2. Acute cystitis without hematuria  POC URINE with Microscopic   3.  [facility] having achieved [facility] ([facility]             Plan:      I spoke to Dr Sarah Davis and he would like to have her stop revlimd   I called her nephrologist Dr Bonnie Woods and she checked with the [facility] pharmacy and asked me to order the valtrex 500 mg daily for 7 days. She said that it would be ok to dose the neurontin as normally but that the patient did not tolerate it well in the past. We discussed dosing at bid for now. She also suggested the possible need for chronic suppressive dosing. I called the pharmacist and she was not sure on how to dose that.         Sarita Eden MD

## 2020-10-09 ENCOUNTER — OFFICE VISIT (OUTPATIENT)
Dept: FAMILY MEDICINE CLINIC | Age: 85
End: 2020-10-09

## 2020-10-09 VITALS
BODY MASS INDEX: 24.19 KG/M2 | RESPIRATION RATE: 12 BRPM | DIASTOLIC BLOOD PRESSURE: 60 MMHG | HEART RATE: 60 BPM | HEIGHT: 61 IN | WEIGHT: 128.13 LBS | TEMPERATURE: 97.4 F | SYSTOLIC BLOOD PRESSURE: 132 MMHG

## 2020-10-09 PROBLEM — G45.9 TRANSIENT ISCHEMIC ATTACK: Status: ACTIVE | Noted: 2020-10-09

## 2020-10-09 PROBLEM — N39.0 URINARY TRACT INFECTIOUS DISEASE: Status: ACTIVE | Noted: 2020-10-09

## 2020-10-09 PROBLEM — N18.5 CHRONIC KIDNEY DISEASE, STAGE V (HCC): Status: ACTIVE | Noted: 2018-08-01

## 2020-10-09 PROBLEM — Z99.2 END-STAGE RENAL DISEASE ON PERITONEAL DIALYSIS (HCC): Status: ACTIVE | Noted: 2020-10-09

## 2020-10-09 PROBLEM — R42 DIZZINESS: Status: ACTIVE | Noted: 2020-10-09

## 2020-10-09 PROBLEM — E78.5 HYPERLIPIDEMIA: Status: ACTIVE | Noted: 2020-10-09

## 2020-10-09 PROBLEM — Z99.2 ESRD (END STAGE RENAL DISEASE) ON DIALYSIS (HCC): Status: ACTIVE | Noted: 2019-11-06

## 2020-10-09 PROBLEM — D47.2 MONOCLONAL GAMMOPATHY: Status: ACTIVE | Noted: 2020-10-09

## 2020-10-09 PROBLEM — N18.6 END-STAGE RENAL DISEASE ON PERITONEAL DIALYSIS (HCC): Status: ACTIVE | Noted: 2020-10-09

## 2020-10-09 PROBLEM — N18.6 ESRD (END STAGE RENAL DISEASE) ON DIALYSIS (HCC): Status: ACTIVE | Noted: 2019-11-06

## 2020-10-09 LAB
BACTERIA URINE, POC: ABNORMAL
BILIRUBIN URINE: 0 MG/DL
BLOOD, URINE: POSITIVE
CASTS URINE, POC: ABNORMAL
CLARITY: ABNORMAL
COLOR: ABNORMAL
CRYSTALS URINE, POC: ABNORMAL
EPI CELLS URINE, POC: ABNORMAL
GLUCOSE URINE: ABNORMAL
KETONES, URINE: NEGATIVE
LEUKOCYTE EST, POC: ABNORMAL
NITRITE, URINE: NEGATIVE
PH UA: 6 (ref 4.5–8)
PROTEIN UA: POSITIVE
RBC URINE, POC: ABNORMAL
SPECIFIC GRAVITY UA: 1 (ref 1–1.03)
UROBILINOGEN, URINE: NORMAL
WBC URINE, POC: ABNORMAL
YEAST URINE, POC: ABNORMAL

## 2020-10-09 PROCEDURE — 1090F PRES/ABSN URINE INCON ASSESS: CPT | Performed by: FAMILY MEDICINE

## 2020-10-09 PROCEDURE — G8427 DOCREV CUR MEDS BY ELIG CLIN: HCPCS | Performed by: FAMILY MEDICINE

## 2020-10-09 PROCEDURE — 81000 URINALYSIS NONAUTO W/SCOPE: CPT | Performed by: FAMILY MEDICINE

## 2020-10-09 PROCEDURE — 1036F TOBACCO NON-USER: CPT | Performed by: FAMILY MEDICINE

## 2020-10-09 PROCEDURE — G8399 PT W/DXA RESULTS DOCUMENT: HCPCS | Performed by: FAMILY MEDICINE

## 2020-10-09 PROCEDURE — G8420 CALC BMI NORM PARAMETERS: HCPCS | Performed by: FAMILY MEDICINE

## 2020-10-09 PROCEDURE — 4040F PNEUMOC VAC/ADMIN/RCVD: CPT | Performed by: FAMILY MEDICINE

## 2020-10-09 PROCEDURE — 1123F ACP DISCUSS/DSCN MKR DOCD: CPT | Performed by: FAMILY MEDICINE

## 2020-10-09 PROCEDURE — 99212 OFFICE O/P EST SF 10 MIN: CPT | Performed by: FAMILY MEDICINE

## 2020-10-09 PROCEDURE — G0438 PPPS, INITIAL VISIT: HCPCS | Performed by: FAMILY MEDICINE

## 2020-10-09 ASSESSMENT — PATIENT HEALTH QUESTIONNAIRE - PHQ9
SUM OF ALL RESPONSES TO PHQ QUESTIONS 1-9: 0
SUM OF ALL RESPONSES TO PHQ9 QUESTIONS 1 & 2: 0
SUM OF ALL RESPONSES TO PHQ QUESTIONS 1-9: 0
1. LITTLE INTEREST OR PLEASURE IN DOING THINGS: 0
2. FEELING DOWN, DEPRESSED OR HOPELESS: 0
1. LITTLE INTEREST OR PLEASURE IN DOING THINGS: 0

## 2020-10-09 ASSESSMENT — LIFESTYLE VARIABLES: HOW OFTEN DO YOU HAVE A DRINK CONTAINING ALCOHOL: 0

## 2020-10-09 NOTE — PROGRESS NOTES
Medicare Annual Wellness Visit  Name: Leonarda Thorne Date: 10/9/2020   MRN: [id number] Sex: Female   Age: 80 y.o. Ethnicity: Non-/Non    : 1935 Race: White      Brook Myrick is here for Medicare AWV    Screenings for behavioral, psychosocial and functional/safety risks, and cognitive dysfunction are all negative except as indicated below. These results, as well as other patient data from the [address] form, are documented in Flowsheets linked to this Encounter. Allergies   Allergen Reactions    Zetia [Ezetimibe]      Pt unsure of side affects       Prior to Visit Medications    Medication Sig Taking? Authorizing Provider   valACYclovir (VALTREX) 500 MG tablet Take 1 tablet by mouth daily  Cruz Stien MD   gabapentin (NEURONTIN) 100 MG capsule Take 1 capsule by mouth 2 times daily for 30 days.   Cruz Stein MD   vitamin B-6 (PYRIDOXINE) 100 MG tablet Take 100 mg by mouth daily  Historical Provider, MD   potassium chloride (KLOR-CON) 20 MEQ packet Take 20 mEq by mouth daily  Historical Provider, MD   dilTIAZem (CARDIZEM) 30 MG tablet Take 30 mg by mouth daily  Historical Provider, MD   ferrous sulfate (IRON 325) 325 (65 Fe) MG tablet Take 325 mg by mouth daily (with breakfast)  Historical Provider, MD   pravastatin (PRAVACHOL) 10 MG tablet Take 10 mg by mouth daily  Historical Provider, MD   magnesium cl-calcium carbonate (SLOW-MAG) 71.5-119 MG TBEC tablet Take 2 tablets by mouth daily  Historical Provider, MD   SYNTHROID 75 MCG tablet Take 75 mcg by mouth daily  Historical Provider, MD   B Complex-C-Folic Acid (TRIPHROCAPS PO) Take by mouth daily  Historical Provider, MD   Cholecalciferol (VITAMIN D3) 2000 units CAPS Take 50,000 Units by mouth once a week   Historical Provider, MD   dexamethasone (DECADRON) 4 MG tablet 20 mg every 7 days Indications: Thakes on Wednesday 5 pills every Monday  Historical Provider, MD Position: Sitting   Cuff Size: Medium Adult   Pulse: 60   Resp: 12   Temp: 97.4 °F (36.3 °C)   TempSrc: Infrared   Weight: 128 lb 2 oz (58.1 kg)   Height: 5' 1\" (1.549 m)     Body mass index is 24.21 kg/m². Based upon direct observation of the patient, evaluation of cognition reveals recent and remote memory intact. 1. She states that the rash is getting better but the stabbing like pains continue . 2. She completed the antiviral and is using the neurontin    General Appearance: alert and oriented to person, place and time, well-developed and well-nourished, in no acute distress  Skin: warm and dry, no rash or erythema and the zoster is crusting over and no secondary infection  Head: normocephalic and atraumatic  Pulmonary/Chest: clear to auscultation bilaterally- no wheezes, rales or rhonchi, normal air movement, no respiratory distress  Cardiovascular: normal rate, normal S1 and S2 and no murmurs  Extremities: no cyanosis, no clubbing and no edema  Musculoskeletal: normal range of motion, no joint swelling, deformity or tenderness  Neurologic: speech normal and is weak and uses a walker    Patient's complete Health Risk Assessment and screening values have been reviewed and are found in Flowsheets. The following problems were reviewed today and where indicated follow up appointments were made and/or referrals ordered. Positive Risk Factor Screenings with Interventions:     Fall Risk:  Timed Up and Go Test > 12 seconds?  (Complete if either Fall Risk answers are Yes): no  2 or more falls in past year?: (!) yes  Fall with injury in past year?: (!) yes  Fall Risk Interventions:    · related to her cancer and renal failure    General Health and ACP:  General  In general, how would you say your health is?: Sheri Alvarez In the past 7 days, have you experienced any of the following? New or Increased Pain, New or Increased Fatigue, Loneliness, Social Isolation, Stress or Anger?: (!) New or Increased Pain, New or Increased Fatigue  Do you get the social and emotional support that you need?: Yes  Do you have a Living Will?: Yes  Advance Directives     Power of  Living Will ACP-Advance Directive ACP-Power of     Not on File Not on File Filed [address] [facility] Risk Interventions:  · pain from the zoster    Health Habits/Nutrition:  Health Habits/Nutrition  Do you exercise for at least 20 minutes 2-3 times per week?: (!) No  Have you lost any weight without trying in the past 3 months?: No  Do you eat fewer than 2 meals per day?: No  Have you seen a dentist within the past year?: Yes  Body mass index: 24.21  Health Habits/Nutrition Interventions:  · can't exercise from the zoster and cancer    Safety:  Safety  Do you have working smoke detectors?: (!) No  Have all throw rugs been removed or fastened?: (!) No  Do you have non-slip mats or surfaces in all bathtubs/showers?: (!) No  Do all of your stairways have a railing or banister?: (!) No  Are your doorways, halls and stairs free of clutter?: Yes  Do you always fasten your seatbelt when you are in a car?: Yes  Safety Interventions:  · we discussed the several safety measures    ADL:  ADLs  In the past 7 days, did you need help from others to perform any of the following everyday activities? Eating, dressing, grooming, bathing, toileting, or walking/balance?: None  In the past 7 days, did you need help from others to take care of any of the following?  Laundry, housekeeping, banking/finances, shopping, telephone use, food preparation, transportation, or taking medications?: Consolidated Samir, Shopping, Food Preparation  ADL Interventions:  · Patient declines any further evaluation/treatment for this issue    Personalized Preventive Plan Current Health Maintenance Status  Immunization History   Administered Date(s) Administered    Influenza 10/05/2012    Influenza Vaccine, unspecified formulation 10/18/2016    Influenza Virus Vaccine 10/28/2013, 10/14/2014, 10/22/2015    Influenza, High Dose (Fluzone 65 yrs and older) 10/31/2017    Influenza, Triv, 3 Years and older, IM (Afluria (5 yrs and older) 10/18/2016    Influenza, Triv, inactivated, subunit, adjuvanted, IM (Fluad 65 yrs and older) 10/17/2018    Pneumococcal Conjugate 13-valent ([id number]) 11/08/2016    Pneumococcal Polysaccharide ([id number]) 10/14/2014, 10/31/2017        Health Maintenance   Topic Date Due    DTaP/Tdap/Td vaccine (1 - Tdap) 11/28/1954    Shingles Vaccine (1 of 2) 11/28/1985    Annual Wellness Visit (AWV)  06/18/2019    Flu vaccine (1) 09/01/2020    Breast cancer screen  01/29/2021    TSH testing  05/26/2021    Lipid screen  06/18/2021    Potassium monitoring  09/01/2021    Creatinine monitoring  09/01/2021    DEXA (modify frequency per FRAX score)  Completed    Pneumococcal 65+ yrs at Risk Vaccine  Completed    Hepatitis A vaccine  Aged Out    Hepatitis B vaccine  Aged Out    Hib vaccine  Aged Out    Meningococcal (ACWY) vaccine  Aged Out     Recommendations for Qik Due: see orders and patient instructions/AVS.  .   Recommended screening schedule for the next 5-10 years is provided to the patient in written form: see Patient Instructions/AVS.      Results for orders placed or performed in visit on 10/09/20   POC URINE with Microscopic   Result Value Ref Range    Color, UA Light Yellow     Clarity, UA Cloudy (A) Clear    Glucose, Ur neg     Bilirubin Urine 0 mg/dL    Ketones, Urine Negative     Specific [place], UA 1.005 1.005 - 1.030    Blood, Urine Positive     pH, UA 6.0 4.5 - 8.0    Protein, UA Positive (A) Negative    Nitrite, Urine Negative     Leukocytes, UA pos     Urobilinogen, Urine Normal     rbc urine, poc many wbc urine, poc loaded     bacteria urine, poc many     yeast urine, poc      casts urine, poc      epi cells urine, poc none     crystals urine, poc

## 2020-10-09 NOTE — PATIENT INSTRUCTIONS
Personalized Preventive Plan for Clarissa Mata - 10/9/2020  Medicare offers a range of preventive health benefits. Some of the tests and screenings are paid in full while other may be subject to a deductible, co-insurance, and/or copay. Some of these benefits include a comprehensive review of your medical history including lifestyle, illnesses that may run in your family, and various assessments and screenings as appropriate. After reviewing your medical record and screening and assessments performed today your provider may have ordered immunizations, labs, imaging, and/or referrals for you. A list of these orders (if applicable) as well as your Preventive Care list are included within your After Visit Summary for your review. Other Preventive Recommendations:    · A preventive eye exam performed by an eye specialist is recommended every 1-2 years to screen for glaucoma; cataracts, macular degeneration, and other eye disorders. · A preventive dental visit is recommended every 6 months. · Try to get at least 150 minutes of exercise per week or 10,000 steps per day on a pedometer . · Order or download the FREE \"Exercise & Physical Activity: Your Everyday Guide\" from [employer]. Call 3-439.464.1746 or search [employer] online. · You need 5753-2692 mg of calcium and 1953-0315 IU of vitamin D per day. It is possible to meet your calcium requirement with diet alone, but a vitamin D supplement is usually necessary to meet this goal.  · When exposed to the sun, use a sunscreen that protects against both UVA and UVB radiation with an SPF of 30 or greater. Reapply every 2 to 3 hours or after sweating, drying off with a towel, or swimming. · Always wear a seat belt when traveling in a car. Always wear a helmet when riding a bicycle or motorcycle.

## 2020-10-13 LAB
REPORT STATUS: NORMAL
SITE/TYPE: NORMAL
URINE CULTURE, ROUTINE: NORMAL

## 2020-10-15 RX ORDER — CIPROFLOXACIN 250 MG/1
250 TABLET, FILM COATED ORAL DAILY
Qty: 5 TABLET | Refills: 0 | Status: SHIPPED | OUTPATIENT
Start: 2020-10-15 | End: 2020-10-20

## 2020-10-20 ENCOUNTER — TELEPHONE (OUTPATIENT)
Dept: FAMILY MEDICINE CLINIC | Age: 85
End: 2020-10-20

## 2020-10-26 ENCOUNTER — CARE COORDINATION (OUTPATIENT)
Dept: FAMILY MEDICINE CLINIC | Age: 85
End: 2020-10-26

## 2020-10-26 NOTE — CARE COORDINATION
[facility] Transitions Initial Follow Up Call    Outreach made within 2 business days of discharge: Yes    Patient: Milo Lacey Patient : 1935   MRN: [id number]  Reason for Admission: There are no discharge diagnoses documented for the most recent discharge. Discharge Date: 10/2/11       Spoke with: [facility]    Discharge department/facility:[facility] Interactive Patient Contact:  Was patient able to fill all prescriptions: Yes  Was patient instructed to bring all medications to the follow-up visit: Yes  Is patient taking all medications as directed in the discharge summary? Yes  Does patient understand their discharge instructions: Yes  Does patient have questions or concerns that need addressed prior to 7-14 day follow up office visit: no    Scheduled appointment with PCP within 7-14 days    Follow Up  Future Appointments   Date Time Provider Kingsley Chen   10/30/2020 10:20 AM Crystal Valenzuela MD  [facility] did not want to talk much. SHe sounded very fatigued. She is aware of her appointment on Friday but did not want to take the time to do more than answer my few questions. She declined reconciling her medications. She stated that she is nauseous and the medicine does not help much. She confirmed it is zofran that she is taking. I suggested she take it before she eats so she can eat and then may not have as much nausea. She agreed. She did not have any other questions.     Darion Franks RN

## 2020-10-30 ENCOUNTER — OFFICE VISIT (OUTPATIENT)
Dept: FAMILY MEDICINE CLINIC | Age: 85
End: 2020-10-30

## 2020-10-30 VITALS
DIASTOLIC BLOOD PRESSURE: 80 MMHG | HEIGHT: 61 IN | TEMPERATURE: 95.9 F | HEART RATE: 64 BPM | SYSTOLIC BLOOD PRESSURE: 136 MMHG | BODY MASS INDEX: 24.21 KG/M2

## 2020-10-30 PROBLEM — B02.9 HERPES ZOSTER: Status: ACTIVE | Noted: 2020-10-30

## 2020-10-30 PROCEDURE — 1036F TOBACCO NON-USER: CPT | Performed by: FAMILY MEDICINE

## 2020-10-30 PROCEDURE — 1111F DSCHRG MED/CURRENT MED MERGE: CPT | Performed by: FAMILY MEDICINE

## 2020-10-30 PROCEDURE — G8427 DOCREV CUR MEDS BY ELIG CLIN: HCPCS | Performed by: FAMILY MEDICINE

## 2020-10-30 PROCEDURE — 1123F ACP DISCUSS/DSCN MKR DOCD: CPT | Performed by: FAMILY MEDICINE

## 2020-10-30 PROCEDURE — 99214 OFFICE O/P EST MOD 30 MIN: CPT | Performed by: FAMILY MEDICINE

## 2020-10-30 PROCEDURE — G8399 PT W/DXA RESULTS DOCUMENT: HCPCS | Performed by: FAMILY MEDICINE

## 2020-10-30 PROCEDURE — 4040F PNEUMOC VAC/ADMIN/RCVD: CPT | Performed by: FAMILY MEDICINE

## 2020-10-30 PROCEDURE — G8420 CALC BMI NORM PARAMETERS: HCPCS | Performed by: FAMILY MEDICINE

## 2020-10-30 PROCEDURE — 1090F PRES/ABSN URINE INCON ASSESS: CPT | Performed by: FAMILY MEDICINE

## 2020-10-30 NOTE — PROGRESS NOTES
Post-Discharge Transitional Care Management Services or Hospital Follow Up      Lakesha Pérez   YOB: 1935    Date of Office Visit:  10/30/2020  Date of Hospital Admission: 9/30/11  Date of Hospital Discharge: 10/2/11  Risk of hospital readmission (high >=14%.  Medium >=10%) :No data recorded    Care management risk score Rising risk (score 2-5) and Complex Care (Scores >=6): 1     Non face to face  following discharge, date last encounter closed (first attempt may have been earlier): 10/27/2020  9:09 AM    Call initiated 2 business days of discharge: Yes    Patient Active Problem List   Diagnosis    Hypertensive disorder    Pure hypercholesterolemia    Hypothyroidism    Osteoarthrosis    Osteopenia    Vitamin D deficiency    Colon polyp, hyperplastic    Hiatal hernia    Gallstone    Multiple myeloma not having achieved remission ([address]    End-stage renal disease on peritoneal dialysis ([address]    Chronic kidney disease, stage V ([address]    Urinary tract infectious disease    Transient ischemic attack    Monoclonal gammopathy    Hyperlipidemia    ESRD (end stage renal disease) on dialysis ([address]    Dizziness    Herpes zoster       Allergies   Allergen Reactions    Zetia [Ezetimibe]      Pt unsure of side affects       Medications listed as ordered at the time of discharge from hospital   Brook Bradley   Home Medication Instructions PRO:    Printed on:10/30/20 9124   Medication Information                      aspirin 81 MG chewable tablet  Take 81 mg by mouth daily             B Complex-C-Folic Acid (TRIPHROCAPS PO)  Take by mouth daily             Cholecalciferol (VITAMIN D3) 2000 units CAPS  Take 50,000 Units by mouth once a week              dexamethasone (DECADRON) 4 MG tablet  20 mg every 7 days Indications: Thakes on Wednesday 5 pills every Monday             dilTIAZem (CARDIZEM) 30 MG tablet  Take 30 mg by mouth daily             ferrous sulfate (IRON 325) 325 (65 Fe) MG tenderness  Neurologic: gait and coordination normal and speech normal    Assessment/Plan:  There are no diagnoses linked to this encounter.       Medical Decision Making: high complexity

## 2020-11-09 ENCOUNTER — CARE COORDINATION (OUTPATIENT)
Dept: FAMILY MEDICINE CLINIC | Age: 85
End: 2020-11-09

## 2020-11-09 NOTE — CARE COORDINATION
She is feeling a little better. She sounded less down on the phone. SHe has not taken any pain medication today to see how it is. SHe said it still hurts from her herpes zoster but the pain is better that it was. I suggested if it hurts she could take her pain medicine at night to help her rest better but if she doesn't need to take them during the day that is an improvement. I will follow up with her in one week.

## 2020-11-16 ENCOUNTER — CARE COORDINATION (OUTPATIENT)
Dept: FAMILY MEDICINE CLINIC | Age: 85
End: 2020-11-16

## 2020-11-23 ENCOUNTER — CARE COORDINATION (OUTPATIENT)
Dept: FAMILY MEDICINE CLINIC | Age: 85
End: 2020-11-23

## 2020-11-23 NOTE — CARE COORDINATION
She is feeling about the same. The pain from the herpes zoster is getting better but it is lingering still. Otherwise she has no complaints or needs.

## 2020-11-25 ENCOUNTER — HOSPITAL ENCOUNTER (OUTPATIENT)
Dept: WOUND CARE | Age: 85
Discharge: HOME | End: 2020-11-25
Payer: MEDICARE

## 2020-11-25 VITALS
RESPIRATION RATE: 16 BRPM | HEART RATE: 68 BPM | DIASTOLIC BLOOD PRESSURE: 78 MMHG | SYSTOLIC BLOOD PRESSURE: 132 MMHG | BODY MASS INDEX: 24.54 KG/M2 | TEMPERATURE: 97.7 F | OXYGEN SATURATION: 98 % | WEIGHT: 125 LBS | HEIGHT: 60 IN

## 2020-11-25 PROCEDURE — 99212 OFFICE O/P EST SF 10 MIN: CPT

## 2020-11-25 RX ORDER — ACYCLOVIR 200 MG/1
200 CAPSULE ORAL 2 TIMES DAILY
COMMUNITY

## 2020-11-25 NOTE — CONSULTS
[address] and Procedure Note      Milo Lacey  MEDICAL RECORD NUMBER:  [id number]  AGE: 80 y.o. GENDER: female  : 1935  EPISODE DATE:  2020    Subjective:   Left flank pain related to zoster   HISTORY OF PRESENT ILLNESS   She is an 49-year-old female patient referred to the wound clinic for left flank pain related to herpes zoster she had history of herpes zoster at the end of September and was treated with pain medication. She was referred for opinion. She is using Neurontin and tramadol. She had history of end-stage renal disease on peritoneal dialysis monoclonal gammopathy and osteopiña. She has occasional pain 2 out of 10. She has swelling on her left flank area, she has no any open wound. Her medical record was reviewed discussed with her son.         PAST MEDICAL HISTORY                 Diagnosis Date    Cholelithiasis 2018    Colon polyp, hyperplastic , ,     Fibrocystic breast disease     Hiatal hernia 2018    HTN (hypertension)     Hyperlipemia 2013    Hyperlipidemia     Hypothyroidism 2014    Multiple myeloma ([address] 2018    Osteopenia 2016    Polyarthritis     Varicose vein of leg 2013    Vitamin D deficiency 2017     PAST SURGICAL HISTORY     PAST SURGICAL HISTORY    Past Surgical History:   Procedure Laterality Date    CARPAL TUNNEL RELEASE      right    [facility] Dr Miguel Silence      growth off of tailbone    PACEMAKER INSERTION  2018    TONSILLECTOMY AND ADENOIDECTOMY       FAMILY HISTORY         Family History   Problem Relation Age of Onset    Heart Disease Mother        SOCIAL HISTORY       Social History     Tobacco Use    Smoking status: Never Smoker    Smokeless tobacco: Never Used   Substance Use Topics    Alcohol use: No    Drug use: No       ALLERGIES       Allergies   Allergen Reactions    Zetia [Ezetimibe]      Pt unsure of side affects         MEDICATIONS       Current Outpatient Medications on File Prior to Encounter   Medication Sig Dispense Refill    acyclovir (ZOVIRAX) 200 MG capsule Take 200 mg by mouth 2 times daily      valACYclovir (VALTREX) 500 MG tablet Take 1 tablet by mouth daily 7 tablet 0    vitamin B-6 (PYRIDOXINE) 100 MG tablet Take 100 mg by mouth daily      potassium chloride (KLOR-CON) 20 MEQ packet Take 20 mEq by mouth daily      ferrous sulfate (IRON 325) 325 (65 Fe) MG tablet Take 325 mg by mouth daily (with breakfast)      pravastatin (PRAVACHOL) 10 MG tablet Take 10 mg by mouth daily      magnesium cl-calcium carbonate (SLOW-MAG) 71.5-119 MG TBEC tablet Take 2 tablets by mouth daily      SYNTHROID 75 MCG tablet Take 75 mcg by mouth daily      B Complex-C-Folic Acid (TRIPHROCAPS PO) Take by mouth daily      Cholecalciferol (VITAMIN D3) 2000 units CAPS Take 50,000 Units by mouth once a week       dexamethasone (DECADRON) 4 MG tablet 20 mg every 7 days Indications: Thakes on Wednesday 5 pills every Monday      pantoprazole (PROTONIX) 40 MG tablet       aspirin 81 MG chewable tablet Take 81 mg by mouth daily      warfarin (COUMADIN) 2.5 MG tablet Take 2.5 mg by mouth daily      metoprolol tartrate (LOPRESSOR) 25 MG tablet Take 25 mg by mouth 2 times daily      furosemide (LASIX) 20 MG tablet Take 20 mg by mouth daily      gabapentin (NEURONTIN) 100 MG capsule Take 1 capsule by mouth 2 times daily for 30 days. (Patient not taking: Reported on 10/30/2020) 60 capsule 0     No current facility-administered medications on file prior to encounter. REVIEW OF SYSTEMS:     Constitutional: no fever, no night sweats, no fatigue. Head: no head ache , no head injury, no migranes. Eye: no blurring of vision, no double vision.   Ears: no hearing difficulty, no tinnitus  Mouth/throat: no ulceration, dental caries , dysphagia  Lungs: no cough, no shortness of breath, renal disease) on dialysis ([facility] N18.6, Z99.2    Dizziness R42    Herpes zoster B02.9          PLAN     Patient examined and evaluated    Please see attached Discharge Instructions    Written patient dismissal instructions given to patient and signed by patient or POA.              Electronically signed by Omi Peraza MD on 11/25/2020 at 12:12 PM

## 2020-11-25 NOTE — PLAN OF CARE
Problem: Wound:  Goal: Will show signs of wound healing; wound closure and no evidence of infection  Description: Will show signs of wound healing; wound closure and no evidence of infection  Outcome: Completed   Patient presents to wound/ID clinic for evaluation of shingles infection. Instructed patient to continue to take the gabapentin 2 pills a day once in the morning and once at night until pain is better, once pain is better, slowly back off of the medication and discontinue use once pain is no longer present. Patient to wait a year or 2 to get the zoster vaccine, patient needs to be off of chemo to receive zoster vaccine, do not get the live zoster vaccine. No antibiotics needed. Do not take the acyclovir for longer than a week. After a week discontinue use. Follow up visit:  As needed. Care plan reviewed with patient. Patient verbalize understanding of the plan of care and contribute to goal setting.

## 2021-10-13 ENCOUNTER — TELEPHONE (OUTPATIENT)
Dept: INFECTIOUS DISEASES | Age: 86
End: 2021-10-13

## 2021-10-13 NOTE — TELEPHONE ENCOUNTER
Patient was called to schedule a appointment with the Infectious Disease Clinic. Talked with Rodrigo Jauregui and at time of call Willie Winkler said they were on their way to see Dr. Raj Mario in [place] due too able to get her in today and not have to wait.

## 2023-02-07 LAB — TSH REFLEX: 0.59 MCIU/ML (ref 0.49–4.67)

## 2023-05-31 ENCOUNTER — HOSPITAL ENCOUNTER (OUTPATIENT)
Dept: INTERVENTIONAL RADIOLOGY/VASCULAR | Age: 88
Discharge: HOME | End: 2023-05-31
Attending: INTERNAL MEDICINE
Payer: MEDICARE

## 2023-05-31 DIAGNOSIS — Z01.818 PREOP EXAMINATION: ICD-10-CM

## 2023-05-31 DIAGNOSIS — Z99.2 DEPENDENCE ON RENAL DIALYSIS (HCC): ICD-10-CM

## 2023-05-31 PROCEDURE — 93971 EXTREMITY STUDY: CPT

## 2023-11-30 ENCOUNTER — TELEPHONE (OUTPATIENT)
Dept: FAMILY MEDICINE CLINIC | Age: 88
End: 2023-11-30

## 2023-11-30 NOTE — TELEPHONE ENCOUNTER
Pt daughter in law annie called says Pt did not file 2021 taxes due to health declining so significantly. Now [employer] is fining her a huge fine. 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 Daniella Valenzuela is wanting to know if  could possibly help with this.  [phone] please call annie once addressed

## 2023-12-05 ENCOUNTER — OFFICE VISIT (OUTPATIENT)
Age: 88
End: 2023-12-05
Payer: MEDICARE

## 2023-12-05 VITALS
SYSTOLIC BLOOD PRESSURE: 138 MMHG | HEIGHT: 62 IN | WEIGHT: 125 LBS | HEART RATE: 60 BPM | BODY MASS INDEX: 23 KG/M2 | DIASTOLIC BLOOD PRESSURE: 67 MMHG

## 2023-12-05 DIAGNOSIS — N18.6 CKD (CHRONIC KIDNEY DISEASE) STAGE V REQUIRING CHRONIC DIALYSIS (HCC): Primary | ICD-10-CM

## 2023-12-05 DIAGNOSIS — Z99.2 CKD (CHRONIC KIDNEY DISEASE) STAGE V REQUIRING CHRONIC DIALYSIS (HCC): Primary | ICD-10-CM

## 2023-12-05 PROCEDURE — G8420 CALC BMI NORM PARAMETERS: HCPCS | Performed by: THORACIC SURGERY (CARDIOTHORACIC VASCULAR SURGERY)

## 2023-12-05 PROCEDURE — G8427 DOCREV CUR MEDS BY ELIG CLIN: HCPCS | Performed by: THORACIC SURGERY (CARDIOTHORACIC VASCULAR SURGERY)

## 2023-12-05 PROCEDURE — G8484 FLU IMMUNIZE NO ADMIN: HCPCS | Performed by: THORACIC SURGERY (CARDIOTHORACIC VASCULAR SURGERY)

## 2023-12-05 PROCEDURE — 1090F PRES/ABSN URINE INCON ASSESS: CPT | Performed by: THORACIC SURGERY (CARDIOTHORACIC VASCULAR SURGERY)

## 2023-12-05 PROCEDURE — 1123F ACP DISCUSS/DSCN MKR DOCD: CPT | Performed by: THORACIC SURGERY (CARDIOTHORACIC VASCULAR SURGERY)

## 2023-12-05 PROCEDURE — 4004F PT TOBACCO SCREEN RCVD TLK: CPT | Performed by: THORACIC SURGERY (CARDIOTHORACIC VASCULAR SURGERY)

## 2023-12-05 PROCEDURE — 99204 OFFICE O/P NEW MOD 45 MIN: CPT | Performed by: THORACIC SURGERY (CARDIOTHORACIC VASCULAR SURGERY)

## 2023-12-05 NOTE — PROGRESS NOTES
M, Th, F dialysis  [place] [facility]
Active Problem List   Diagnosis    Hypertensive disorder    Pure hypercholesterolemia    Hypothyroidism    Osteoarthrosis    Osteopenia    Vitamin D deficiency    Colon polyp, hyperplastic    Hiatal hernia    Gallstone    Multiple myeloma not having achieved remission ([address])    End-stage renal disease on peritoneal dialysis (HCC)    Chronic kidney disease, stage V (HCC)    Urinary tract infectious disease    Transient ischemic attack    Monoclonal gammopathy    Hyperlipidemia    ESRD (end stage renal disease) on dialysis ([address])    Dizziness    Herpes zoster       Assessment: Candidate for right upper arm AV fistula. Plan 12/5/23:  1) patient states that she does not wish to proceed with the AV fistula surgery at this time. Time spent with the patient; 47 minutes.     Electronically by Sanam Bhatia MD  on 12/5/2023 at 3:59 PM

## 2023-12-05 NOTE — PATIENT INSTRUCTIONS
If you receive a survey asking about your care experience, please respond. Your answers will help ensure you receive high-quality care at this office. Thank you! Your Medical Assistant today: Erin Lopez. Thank you for coming to our office! It was a pleasure to serve you.

## 2024-06-05 ENCOUNTER — TELEPHONE (OUTPATIENT)
Dept: SURGERY | Age: 89
End: 2024-06-05

## 2024-06-05 NOTE — TELEPHONE ENCOUNTER
Called [facility] to discuss patients upcoming appointment and request records and information. Nurse for patient states imaging performed through facility agency, uncertain of results in chart. Nurse did pass along family hesitant and unlikely to pursue surgery due to patients age and co morbidities. Currently patient is asymptomatic. Facility will look for imaging and results for appointment.

## 2024-06-07 ENCOUNTER — HOSPITAL ENCOUNTER (EMERGENCY)
Age: 89
Discharge: HOME | End: 2024-06-07
Attending: EMERGENCY MEDICINE
Payer: MEDICARE

## 2024-06-07 VITALS
BODY MASS INDEX: 23 KG/M2 | WEIGHT: 125 LBS | SYSTOLIC BLOOD PRESSURE: 161 MMHG | HEART RATE: 77 BPM | TEMPERATURE: 98.2 F | DIASTOLIC BLOOD PRESSURE: 84 MMHG | OXYGEN SATURATION: 92 % | HEIGHT: 62 IN | RESPIRATION RATE: 18 BRPM

## 2024-06-07 DIAGNOSIS — T82.898A CLOTTED VASCULAR CATHETER, INITIAL ENCOUNTER (HCC): Primary | ICD-10-CM

## 2024-06-07 PROCEDURE — 99283 EMERGENCY DEPT VISIT LOW MDM: CPT

## 2024-06-07 PROCEDURE — 6360000002 HC RX W HCPCS: Performed by: STUDENT IN AN ORGANIZED HEALTH CARE EDUCATION/TRAINING PROGRAM

## 2024-06-07 RX ADMIN — ALTEPLASE 1 MG: 2.2 INJECTION, POWDER, LYOPHILIZED, FOR SOLUTION INTRAVENOUS at 11:12

## 2024-06-07 ASSESSMENT — PAIN SCALES - GENERAL
PAINLEVEL_OUTOF10: 4

## 2024-06-07 ASSESSMENT — PAIN DESCRIPTION - PAIN TYPE
TYPE: CHRONIC PAIN
TYPE: CHRONIC PAIN

## 2024-06-07 ASSESSMENT — PAIN DESCRIPTION - LOCATION
LOCATION: HIP
LOCATION: HIP

## 2024-06-07 ASSESSMENT — PAIN - FUNCTIONAL ASSESSMENT
PAIN_FUNCTIONAL_ASSESSMENT: 0-10

## 2024-06-07 NOTE — ED TRIAGE NOTES
Pt presents to the ED from [place] due to her dialysis catheter being clogged. Pt states she is suppose to have dialysis today. Pt denies any complaints.

## 2024-06-07 NOTE — DISCHARGE INSTRUCTIONS
You are seen today in the emergency department for a clogged dialysis catheter.  It was flushed in the emergency department.  Please follow-up with your dialysis.  Follow-up with family doctor regarding today's visit.

## 2024-06-07 NOTE — ED PROVIDER NOTES
[facility] EMERGENCY DEPT    Pt Name: Brook Bradley  MRN: [id number]  Birthdate 1935  Date of evaluation: 6/7/2024  Resident Physician: Liya Duval MD  Supervising Physician: Dr. Charbel Lomas MD    CHIEF COMPLAINT       Chief Complaint   Patient presents with    Vascular Access Problem     HISTORY OF PRESENT ILLNESS   Brook Bradley is a 88 y.o. female  who presents to the emergency department for evaluation of clogged dialysis catheter.    Patient is coming from [facility] [place], due to dialysis catheter being clogged.  She is set for dialysis later today therefore needs it to be unclogged.  Patient denies any chest pain shortness of breath no abdominal pain.  No symptoms at this time.    The patient has no other acute complaints at this time.    Review of Systems    Negative Except as Documented Above.    PASTMEDICAL HISTORY     Past Medical History:   Diagnosis Date    Cholelithiasis 01/2018    Colon polyp, hyperplastic 1997, 2004, 2017    Fibrocystic breast disease     Hiatal hernia 01/2018    HTN (hypertension) 1999    Hyperlipemia 11/6/2013    Hyperlipidemia 2009    Hypothyroidism 11/2014    Multiple myeloma (HCC) 02/2018    Osteopenia 12/2016    Polyarthritis     Varicose vein of leg 2013    Vitamin D deficiency 01/2017       Patient Active Problem List   Diagnosis Code    Hypertensive disorder I10    Pure hypercholesterolemia E78.00    Hypothyroidism E03.9    Osteoarthrosis M19.90    Osteopenia M85.80    Vitamin D deficiency E55.9    Colon polyp, hyperplastic K63.5    Hiatal hernia K44.9    Gallstone K80.20    Multiple myeloma not having achieved remission (HCC) C90.00    End-stage renal disease on peritoneal dialysis (HCC) N18.6, Z99.2    Chronic kidney disease, stage V (HCC) N18.5    Urinary tract infectious disease N39.0    Transient ischemic attack G45.9    Monoclonal gammopathy D47.2    Hyperlipidemia E78.5    ESRD (end stage renal disease) on dialysis (HCC) N18.6, Z99.2

## 2025-01-02 ENCOUNTER — APPOINTMENT (OUTPATIENT)
Dept: GENERAL RADIOLOGY | Age: 89
End: 2025-01-02
Payer: MEDICARE

## 2025-01-02 ENCOUNTER — HOSPITAL ENCOUNTER (EMERGENCY)
Age: 89
Discharge: HOME | End: 2025-01-02
Attending: FAMILY MEDICINE
Payer: MEDICARE

## 2025-01-02 ENCOUNTER — APPOINTMENT (OUTPATIENT)
Dept: CT IMAGING | Age: 89
End: 2025-01-02
Payer: MEDICARE

## 2025-01-02 VITALS
OXYGEN SATURATION: 97 % | BODY MASS INDEX: 26.78 KG/M2 | HEIGHT: 62 IN | DIASTOLIC BLOOD PRESSURE: 67 MMHG | SYSTOLIC BLOOD PRESSURE: 95 MMHG | HEART RATE: 70 BPM | WEIGHT: 145.5 LBS | TEMPERATURE: 97.6 F | RESPIRATION RATE: 15 BRPM

## 2025-01-02 DIAGNOSIS — N18.6 ESRD (END STAGE RENAL DISEASE) ON DIALYSIS (HCC): Primary | ICD-10-CM

## 2025-01-02 DIAGNOSIS — Z99.2 ESRD (END STAGE RENAL DISEASE) ON DIALYSIS (HCC): Primary | ICD-10-CM

## 2025-01-02 LAB
ALBUMIN SERPL BCG-MCNC: 3.8 G/DL (ref 3.5–5.1)
ALP SERPL-CCNC: 160 U/L (ref 38–126)
ALT SERPL W/O P-5'-P-CCNC: 19 U/L (ref 11–66)
ANION GAP SERPL CALC-SCNC: 20 MEQ/L (ref 8–16)
AST SERPL-CCNC: 20 U/L (ref 5–40)
BASOPHILS ABSOLUTE: 0 THOU/MM3 (ref 0–0.1)
BASOPHILS NFR BLD AUTO: 0.4 %
BILIRUB SERPL-MCNC: 0.3 MG/DL (ref 0.3–1.2)
BUN SERPL-MCNC: 70 MG/DL (ref 7–22)
CALCIUM SERPL-MCNC: 9.3 MG/DL (ref 8.5–10.5)
CHLORIDE SERPL-SCNC: 100 MEQ/L (ref 98–111)
CO2 SERPL-SCNC: 17 MEQ/L (ref 23–33)
CREAT SERPL-MCNC: 8.6 MG/DL (ref 0.4–1.2)
DEPRECATED RDW RBC AUTO: 61.7 FL (ref 35–45)
EKG ATRIAL RATE: 59 BPM
EKG Q-T INTERVAL: 530 MS
EKG QRS DURATION: 168 MS
EKG QTC CALCULATION (BAZETT): 580 MS
EKG R AXIS: -12 DEGREES
EKG T AXIS: -56 DEGREES
EKG VENTRICULAR RATE: 72 BPM
EOSINOPHIL NFR BLD AUTO: 1 %
EOSINOPHILS ABSOLUTE: 0.1 THOU/MM3 (ref 0–0.4)
ERYTHROCYTE [DISTWIDTH] IN BLOOD BY AUTOMATED COUNT: 15.7 % (ref 11.5–14.5)
GFR SERPL CREATININE-BSD FRML MDRD: 4 ML/MIN/1.73M2
GLUCOSE SERPL-MCNC: 136 MG/DL (ref 70–108)
HBV CORE IGM SERPL QL IA: NEGATIVE
HBV SURFACE AB SER QL IA: NEGATIVE
HBV SURFACE AG SERPL QL IA: NEGATIVE
HCT VFR BLD AUTO: 41.5 % (ref 37–47)
HGB BLD-MCNC: 13.1 GM/DL (ref 12–16)
IMM GRANULOCYTES # BLD AUTO: 0.04 THOU/MM3 (ref 0–0.07)
IMM GRANULOCYTES NFR BLD AUTO: 0.6 %
INR PPP: 1.35 (ref 0.85–1.13)
LYMPHOCYTES ABSOLUTE: 0.9 THOU/MM3 (ref 1–4.8)
LYMPHOCYTES NFR BLD AUTO: 13 %
MCH RBC QN AUTO: 33.5 PG (ref 26–33)
MCHC RBC AUTO-ENTMCNC: 31.6 GM/DL (ref 32.2–35.5)
MCV RBC AUTO: 106.1 FL (ref 81–99)
MONOCYTES ABSOLUTE: 0.3 THOU/MM3 (ref 0.4–1.3)
MONOCYTES NFR BLD AUTO: 5.2 %
NEUTROPHILS ABSOLUTE: 5.3 THOU/MM3 (ref 1.8–7.7)
NEUTROPHILS NFR BLD AUTO: 79.8 %
NRBC BLD AUTO-RTO: 0 /100 WBC
OSMOLALITY SERPL CALC.SUM OF ELEC: 296.4 MOSMOL/KG (ref 275–300)
PLATELET # BLD AUTO: 187 THOU/MM3 (ref 130–400)
PMV BLD AUTO: 11 FL (ref 9.4–12.4)
POTASSIUM SERPL-SCNC: 3.8 MEQ/L (ref 3.5–5.2)
PROT SERPL-MCNC: 7.4 G/DL (ref 6.1–8)
RBC # BLD AUTO: 3.91 MILL/MM3 (ref 4.2–5.4)
SODIUM SERPL-SCNC: 137 MEQ/L (ref 135–145)
WBC # BLD AUTO: 6.7 THOU/MM3 (ref 4.8–10.8)

## 2025-01-02 PROCEDURE — 36415 COLL VENOUS BLD VENIPUNCTURE: CPT

## 2025-01-02 PROCEDURE — 73502 X-RAY EXAM HIP UNI 2-3 VIEWS: CPT

## 2025-01-02 PROCEDURE — 86705 HEP B CORE ANTIBODY IGM: CPT

## 2025-01-02 PROCEDURE — 85610 PROTHROMBIN TIME: CPT

## 2025-01-02 PROCEDURE — 87340 HEPATITIS B SURFACE AG IA: CPT

## 2025-01-02 PROCEDURE — 85025 COMPLETE CBC W/AUTO DIFF WBC: CPT

## 2025-01-02 PROCEDURE — 99285 EMERGENCY DEPT VISIT HI MDM: CPT

## 2025-01-02 PROCEDURE — 86706 HEP B SURFACE ANTIBODY: CPT

## 2025-01-02 PROCEDURE — 93005 ELECTROCARDIOGRAM TRACING: CPT | Performed by: FAMILY MEDICINE

## 2025-01-02 PROCEDURE — 72192 CT PELVIS W/O DYE: CPT

## 2025-01-02 PROCEDURE — 80053 COMPREHEN METABOLIC PANEL: CPT

## 2025-01-02 PROCEDURE — 93010 ELECTROCARDIOGRAM REPORT: CPT | Performed by: INTERNAL MEDICINE

## 2025-01-02 ASSESSMENT — PAIN - FUNCTIONAL ASSESSMENT
PAIN_FUNCTIONAL_ASSESSMENT: NONE - DENIES PAIN

## 2025-01-02 NOTE — ED NOTES
Pt in bed, eyes open, respirations even and unlabored. Vital signs reassessed, granddaughter at bedside. No needs voiced.

## 2025-01-02 NOTE — ED NOTES
Pt. Resting in bed with even and unlabored respirations. Pt. Denies any pain at this time. Pt. Updated about plan of care and treatment. Pt. Has no further concerns, questions or needs at this time. Call light within reach.

## 2025-01-02 NOTE — ED NOTES
Report received from Adriana PERKINS. Pt in bed, eyes open, respirations even and unlabored. Vital signs reassessed, granddaughter at bedside. No needs voiced.

## 2025-01-02 NOTE — ED NOTES
Pt. Resting in bed with even and unlabored respirations. Pt. Denies any pain. Pt. Updated about plan of care and treatment. Pt. Has no further concerns, questions or needs at this time. Call light within reach.

## 2025-01-02 NOTE — ED PROVIDER NOTES
[facility] EMERGENCY DEPT  EMERGENCY DEPARTMENT ENCOUNTER          Pt Name: Brook Bradley  MRN: [id number]  Birthdate 1935  Date of evaluation: 1/2/2025  Physician: Jackson Broussard MD  Supervising Attending Physician: Darrian Rhodes MD       CHIEF COMPLAINT       Chief Complaint   Patient presents with    Hypotension         HISTORY OF PRESENT ILLNESS    HPI  Brook Bradley is a 89 y.o. female who presents to the emergency department from nursing home, brought in by EMS for evaluation of hypotension.  Patient reports to emergency department from dialysis for complaints of low blood pressure.  He reported that while at dialysis patient's blood pressure was low.  They did give patient's home dose of midodrine however remained low.  Due to this they sent the patient here to the ED.  On arrival to the emergency department patient does report some pain in her right hip.  She denies any chest pain shortness of breath.  Denies any fever or chills.  Denies any lightheadedness or dizziness.  The patient has no other acute complaints at this time.            PAST MEDICAL AND SURGICAL HISTORY     Past Medical History:   Diagnosis Date    Cholelithiasis 01/2018    Colon polyp, hyperplastic 1997, 2004, 2017    Fibrocystic breast disease     Hiatal hernia 01/2018    HTN (hypertension) 1999    Hyperlipemia 11/6/2013    Hyperlipidemia 2009    Hypothyroidism 11/2014    Multiple myeloma (HCC) 02/2018    Osteopenia 12/2016    Polyarthritis     Varicose vein of leg 2013    Vitamin D deficiency 01/2017     Past Surgical History:   Procedure Laterality Date    CARPAL TUNNEL RELEASE  2012    right    COLONOSCOPY  [facility]/21/[facility]    [facility] Dr Craig    HYSTERECTOMY (CERVIX STATUS UNKNOWN)      OTHER SURGICAL HISTORY      growth off of tailbone    PACEMAKER INSERTION  03/2018    TONSILLECTOMY AND ADENOIDECTOMY           MEDICATIONS   No current facility-administered medications for this encounter.    Current  interpreted by me in the clinical context of this patient.  All EKGs are also interpreted by our Cardiology department, final interpretation may not be available as of the writing of this note.      PREVIOUS RECORDS  AND EXTERNAL INFORMATION REVIEWED   History obtained from: chart review, the patient, and EMS.    Pertinent previous and/or external records reviewed:  Past medical history, previous hospital visit .    Case discussed with specialties other than Emergency Medicine: Not Applicable      MEDICAL DECISION MAKING   Initial Assessment Summary:   89-year-old female past medical history hypertension, multiple myeloma, ESRD on dialysis presents to the ED for hypotension.  Please see ED course and MDM sections below for continuation and resolution of this initial assessment if applicable.    Initial plan:   CBC, CMP  X-ray pelvis       Comorbid conditions pertinent to this ED encounter:  Not applicable      Differential Diagnosis includes but is not limited to:  Hypotension, orthostatic hypotension, medication noncompliance, dehydration, electrolyte abnormality, adverse effect of dialysis       Decision Rules/Clinical Scores utilized:  Not Applicable       Code Status:  Not addressed during this ED visit    Social determinants of health impacting treatment or disposition:  Considered and not applicable     MIPS documentation:  N/A    Medical Decision Making    89-year-old female past medical history hypertension, multiple myeloma, ESRD on dialysis presents to the ED for hypotension.  On arrival to the emergency department patient's blood pressure had improved.  Patient was complaining however of right hip pain so imaging was obtained which did not show any acute fracture.  Discussed these results with patient at bedside.  Patient was no longer hypotensive.  Advised patient to follow-up with her primary care physician.  Patient discharged home with strict return precautions.      ED COURSE   ED Medications

## 2025-01-02 NOTE — ED NOTES
Pt arrives to the ED from dialysis for low blood pressure. Pt er squad had low blood pressure even after the administration of midodrine. Pt has been sick over the last few days with vomiting however was feeling better today. Pt denies any pain at this time. Denies any abdominal pain, sob, chest pain, or fever. Pt A&O x4, however appears to be slightly confused. Has chronic colostomy. Pt respirations are unlabored. Pt blood pressure 106/76 at this time.

## 2025-03-14 ENCOUNTER — HOSPITAL ENCOUNTER (EMERGENCY)
Age: 89
Discharge: HOME | End: 2025-03-14
Attending: EMERGENCY MEDICINE
Payer: MEDICARE

## 2025-03-14 VITALS
TEMPERATURE: 97.2 F | HEART RATE: 73 BPM | WEIGHT: 141.31 LBS | RESPIRATION RATE: 16 BRPM | OXYGEN SATURATION: 96 % | HEIGHT: 62 IN | BODY MASS INDEX: 26.01 KG/M2 | DIASTOLIC BLOOD PRESSURE: 97 MMHG | SYSTOLIC BLOOD PRESSURE: 159 MMHG

## 2025-03-14 DIAGNOSIS — Z99.2 ENCOUNTER FOR DIALYSIS: Primary | ICD-10-CM

## 2025-03-14 LAB
ANION GAP SERPL CALC-SCNC: 12 MEQ/L (ref 8–16)
BUN SERPL-MCNC: 40 MG/DL (ref 8–23)
CALCIUM SERPL-MCNC: 9.6 MG/DL (ref 8.8–10.2)
CHLORIDE SERPL-SCNC: 112 MEQ/L (ref 98–111)
CO2 SERPL-SCNC: 18 MEQ/L (ref 22–29)
CREAT SERPL-MCNC: 5 MG/DL (ref 0.5–0.9)
DEPRECATED RDW RBC AUTO: 68.2 FL (ref 35–45)
ERYTHROCYTE [DISTWIDTH] IN BLOOD BY AUTOMATED COUNT: 17.1 % (ref 11.5–14.5)
GFR SERPL CREATININE-BSD FRML MDRD: 8 ML/MIN/1.73M2
GLUCOSE BLD STRIP.AUTO-MCNC: 94 MG/DL (ref 70–108)
GLUCOSE SERPL-MCNC: 96 MG/DL (ref 74–109)
HBV SURFACE AB SER QL IA: < 3.5 MIU/ML
HCT VFR BLD AUTO: 35.3 % (ref 37–47)
HGB BLD-MCNC: 11 GM/DL (ref 12–16)
MCH RBC QN AUTO: 34.4 PG (ref 26–33)
MCHC RBC AUTO-ENTMCNC: 31.2 GM/DL (ref 32.2–35.5)
MCV RBC AUTO: 110.3 FL (ref 81–99)
OSMOLALITY SERPL CALC.SUM OF ELEC: 292.7 MOSMOL/KG (ref 275–300)
PLATELET # BLD AUTO: 146 THOU/MM3 (ref 130–400)
PMV BLD AUTO: 10.4 FL (ref 9.4–12.4)
POTASSIUM SERPL-SCNC: 5.2 MEQ/L (ref 3.5–5.2)
RBC # BLD AUTO: 3.2 MILL/MM3 (ref 4.2–5.4)
SCAN OF BLOOD SMEAR: NORMAL
SODIUM SERPL-SCNC: 142 MEQ/L (ref 135–145)
WBC # BLD AUTO: 5.7 THOU/MM3 (ref 4.8–10.8)

## 2025-03-14 PROCEDURE — 86706 HEP B SURFACE ANTIBODY: CPT

## 2025-03-14 PROCEDURE — 36415 COLL VENOUS BLD VENIPUNCTURE: CPT

## 2025-03-14 PROCEDURE — 80048 BASIC METABOLIC PNL TOTAL CA: CPT

## 2025-03-14 PROCEDURE — 85027 COMPLETE CBC AUTOMATED: CPT

## 2025-03-14 PROCEDURE — 99283 EMERGENCY DEPT VISIT LOW MDM: CPT

## 2025-03-14 PROCEDURE — 82948 REAGENT STRIP/BLOOD GLUCOSE: CPT

## 2025-03-14 PROCEDURE — 90935 HEMODIALYSIS ONE EVALUATION: CPT

## 2025-03-14 ASSESSMENT — PAIN - FUNCTIONAL ASSESSMENT
PAIN_FUNCTIONAL_ASSESSMENT: NONE - DENIES PAIN
PAIN_FUNCTIONAL_ASSESSMENT: NONE - DENIES PAIN

## 2025-03-14 NOTE — ED NOTES
Pt presents to ED via lobby for scheduled dialysis appointment. Pt denies any pain at this time, denies any acute concerns.

## 2025-03-14 NOTE — FLOWSHEET NOTE
03/14/25 1658   Vital Signs   BP (!) 159/97   Temp 97.2 °F (36.2 °C)   Pulse 73   Respirations 16   Weight - Scale 64.1 kg (141 lb 5 oz)   Weight Method Bed scale   Percent Weight Change -2.54   Post-Hemodialysis Assessment   Post-Treatment Procedures Blood returned;Catheter Capped, clamped with Saline x2 ports   Machine Disinfection Process Acid/Vinegar Clean;Heat Disinfect;Exterior Machine Disinfection   Rinseback Volume (ml) 400 ml   Dialyzer Clearance Moderately streaked   Duration of Treatment (minutes) 180 minutes   Hemodialysis Intake (ml) 400 ml   Hemodialysis Output (ml) 900 ml   NET Removed (ml) 500   Tolerated Treatment Good     completed 3 hour treatment and removed 500 mls. pt tolerated HD TX well. Dialysis TX ended, all blood returned with 400 mls rinse back. CVC dressing is clean, dry and intact. flushed with 0.9% NS, clamped with tego's attached. report given to primary nurse. record completed and printed to be scanned into pt's EMR.

## 2025-03-14 NOTE — ED PROVIDER NOTES

## 2025-03-14 NOTE — ED PROVIDER NOTES
normal.      Nose: Nose normal. No congestion or rhinorrhea.      Mouth/Throat:      Mouth: Mucous membranes are moist.      Pharynx: Oropharynx is clear.   Eyes:      General: No scleral icterus.     Conjunctiva/sclera: Conjunctivae normal.   Cardiovascular:      Rate and Rhythm: Normal rate and regular rhythm.      Pulses: Normal pulses.      Heart sounds: Normal heart sounds. No murmur heard.  Pulmonary:      Effort: Pulmonary effort is normal. No respiratory distress.      Breath sounds: Normal breath sounds. No stridor. No wheezing, rhonchi or rales.   Chest:      Comments: Dialysis catheter present on right upper chest.  No surrounding erythema or warmth.  Abdominal:      General: Abdomen is flat. There is no distension.      Palpations: Abdomen is soft.      Tenderness: There is no abdominal tenderness. There is no guarding or rebound.   Musculoskeletal:         General: Normal range of motion.      Cervical back: Normal range of motion and neck supple. No rigidity.      Right lower leg: No edema.      Left lower leg: No edema.   Lymphadenopathy:      Cervical: No cervical adenopathy.   Skin:     General: Skin is warm and dry.      Capillary Refill: Capillary refill takes less than 2 seconds.      Coloration: Skin is not jaundiced.   Neurological:      General: No focal deficit present.      Mental Status: She is alert and oriented to person, place, and time.   Psychiatric:         Mood and Affect: Mood normal.         ED RESULTS   Laboratory results (none if blank):  Labs Reviewed - No data to display  All laboratory results are individually reviewed and interpreted by me in the clinical context of this patient.  See ED course below for results interpretation if applicable.  (A negative COVID-19 test should be interpreted as COVID no longer suspected unless otherwise noted in this encounter documentation note)  (Any cultures that may have been sent were not resulted at the time of this patient ED  [id number]  [phone]    Schedule an appointment as soon as possible for a visit                 FINAL DIAGNOSES:  Final diagnoses:   Encounter for dialysis       Condition: condition: stable  Dispo: Discharge to dialysis  DISPOSITION Discharge - Pending Orders Complete 03/14/2025 06:17:47 AM   DISPOSITION CONDITION Stable           This transcription was electronically signed. It was dictated by use of voice recognition software and electronically transcribed. The transcription may contain errors not detected in proofreading.